# Patient Record
Sex: FEMALE | Race: WHITE | NOT HISPANIC OR LATINO | Employment: OTHER | URBAN - METROPOLITAN AREA
[De-identification: names, ages, dates, MRNs, and addresses within clinical notes are randomized per-mention and may not be internally consistent; named-entity substitution may affect disease eponyms.]

---

## 2017-11-05 ENCOUNTER — APPOINTMENT (OUTPATIENT)
Dept: GENERAL RADIOLOGY | Facility: HOSPITAL | Age: 82
End: 2017-11-05

## 2017-11-05 ENCOUNTER — HOSPITAL ENCOUNTER (EMERGENCY)
Facility: HOSPITAL | Age: 82
Discharge: NURSING, REHAB OR LONG-TERM CARE | End: 2017-11-05
Attending: EMERGENCY MEDICINE | Admitting: EMERGENCY MEDICINE

## 2017-11-05 VITALS
OXYGEN SATURATION: 99 % | DIASTOLIC BLOOD PRESSURE: 83 MMHG | SYSTOLIC BLOOD PRESSURE: 150 MMHG | WEIGHT: 153 LBS | HEART RATE: 50 BPM | TEMPERATURE: 97.6 F | HEIGHT: 63 IN | BODY MASS INDEX: 27.11 KG/M2 | RESPIRATION RATE: 14 BRPM

## 2017-11-05 DIAGNOSIS — R06.00 DYSPNEA, UNSPECIFIED TYPE: Primary | ICD-10-CM

## 2017-11-05 DIAGNOSIS — R07.89 CHEST DISCOMFORT: ICD-10-CM

## 2017-11-05 LAB
ALBUMIN SERPL-MCNC: 3.8 G/DL (ref 3.5–5.2)
ALBUMIN/GLOB SERPL: 1.5 G/DL
ALP SERPL-CCNC: 84 U/L (ref 40–129)
ALT SERPL W P-5'-P-CCNC: 6 U/L (ref 5–33)
ANION GAP SERPL CALCULATED.3IONS-SCNC: 10.8 MMOL/L
APTT PPP: 39.9 SECONDS (ref 24.3–38.1)
AST SERPL-CCNC: 13 U/L (ref 5–32)
BASOPHILS # BLD AUTO: 0.02 10*3/MM3 (ref 0–0.2)
BASOPHILS NFR BLD AUTO: 0.4 % (ref 0–2)
BILIRUB SERPL-MCNC: 0.4 MG/DL (ref 0.2–1.2)
BUN BLD-MCNC: 25 MG/DL (ref 8–23)
BUN/CREAT SERPL: 19.5 (ref 7–25)
CALCIUM SPEC-SCNC: 9.9 MG/DL (ref 8.2–9.6)
CHLORIDE SERPL-SCNC: 102 MMOL/L (ref 98–107)
CO2 SERPL-SCNC: 27.2 MMOL/L (ref 22–29)
CREAT BLD-MCNC: 1.28 MG/DL (ref 0.57–1)
DEPRECATED RDW RBC AUTO: 50.4 FL (ref 37–54)
EOSINOPHIL # BLD AUTO: 0.15 10*3/MM3 (ref 0.1–0.3)
EOSINOPHIL NFR BLD AUTO: 3.1 % (ref 0–4)
ERYTHROCYTE [DISTWIDTH] IN BLOOD BY AUTOMATED COUNT: 14.3 % (ref 11.5–14.5)
GFR SERPL CREATININE-BSD FRML MDRD: 39 ML/MIN/1.73
GLOBULIN UR ELPH-MCNC: 2.6 GM/DL
GLUCOSE BLD-MCNC: 103 MG/DL (ref 65–99)
HCT VFR BLD AUTO: 39.8 % (ref 37–47)
HGB BLD-MCNC: 13 G/DL (ref 12–16)
IMM GRANULOCYTES # BLD: 0.04 10*3/MM3 (ref 0–0.03)
IMM GRANULOCYTES NFR BLD: 0.8 % (ref 0–0.5)
INR PPP: 1.19 (ref 0.9–1.1)
LYMPHOCYTES # BLD AUTO: 0.84 10*3/MM3 (ref 0.6–4.8)
LYMPHOCYTES NFR BLD AUTO: 17.4 % (ref 20–45)
MCH RBC QN AUTO: 31.4 PG (ref 27–31)
MCHC RBC AUTO-ENTMCNC: 32.7 G/DL (ref 31–37)
MCV RBC AUTO: 96.1 FL (ref 81–99)
MONOCYTES # BLD AUTO: 0.43 10*3/MM3 (ref 0–1)
MONOCYTES NFR BLD AUTO: 8.9 % (ref 3–8)
NEUTROPHILS # BLD AUTO: 3.36 10*3/MM3 (ref 1.5–8.3)
NEUTROPHILS NFR BLD AUTO: 69.4 % (ref 45–70)
NRBC BLD MANUAL-RTO: 0 /100 WBC (ref 0–0)
PLATELET # BLD AUTO: 240 10*3/MM3 (ref 140–500)
PMV BLD AUTO: 11 FL (ref 7.4–10.4)
POTASSIUM BLD-SCNC: 4.2 MMOL/L (ref 3.5–5.2)
PROT SERPL-MCNC: 6.4 G/DL (ref 6–8.5)
PROTHROMBIN TIME: 15.2 SECONDS (ref 12.1–15)
RBC # BLD AUTO: 4.14 10*6/MM3 (ref 4.2–5.4)
SODIUM BLD-SCNC: 140 MMOL/L (ref 136–145)
TROPONIN T SERPL-MCNC: <0.01 NG/ML (ref 0–0.03)
WBC NRBC COR # BLD: 4.84 10*3/MM3 (ref 4.8–10.8)

## 2017-11-05 PROCEDURE — 71020 HC CHEST PA AND LATERAL: CPT

## 2017-11-05 PROCEDURE — 93005 ELECTROCARDIOGRAM TRACING: CPT | Performed by: EMERGENCY MEDICINE

## 2017-11-05 PROCEDURE — 85610 PROTHROMBIN TIME: CPT | Performed by: EMERGENCY MEDICINE

## 2017-11-05 PROCEDURE — 85025 COMPLETE CBC W/AUTO DIFF WBC: CPT | Performed by: EMERGENCY MEDICINE

## 2017-11-05 PROCEDURE — 99284 EMERGENCY DEPT VISIT MOD MDM: CPT | Performed by: EMERGENCY MEDICINE

## 2017-11-05 PROCEDURE — 93010 ELECTROCARDIOGRAM REPORT: CPT | Performed by: INTERNAL MEDICINE

## 2017-11-05 PROCEDURE — 85730 THROMBOPLASTIN TIME PARTIAL: CPT | Performed by: EMERGENCY MEDICINE

## 2017-11-05 PROCEDURE — 84484 ASSAY OF TROPONIN QUANT: CPT | Performed by: EMERGENCY MEDICINE

## 2017-11-05 PROCEDURE — 99284 EMERGENCY DEPT VISIT MOD MDM: CPT

## 2017-11-05 PROCEDURE — 80053 COMPREHEN METABOLIC PANEL: CPT | Performed by: EMERGENCY MEDICINE

## 2017-11-05 RX ORDER — DONEPEZIL HYDROCHLORIDE 10 MG/1
10 TABLET, FILM COATED ORAL NIGHTLY
COMMUNITY

## 2017-11-05 RX ORDER — OXYCODONE HYDROCHLORIDE AND ACETAMINOPHEN 5; 325 MG/1; MG/1
1 TABLET ORAL EVERY 4 HOURS PRN
COMMUNITY

## 2017-11-05 RX ORDER — LANOLIN ALCOHOL/MO/W.PET/CERES
1000 CREAM (GRAM) TOPICAL DAILY
COMMUNITY

## 2017-11-05 RX ORDER — CALCIUM CARBONATE 200(500)MG
1 TABLET,CHEWABLE ORAL DAILY
COMMUNITY

## 2017-11-05 RX ORDER — SODIUM BICARBONATE 650 MG/1
650 TABLET ORAL DAILY
COMMUNITY

## 2017-11-05 RX ORDER — CARVEDILOL 3.12 MG/1
6.25 TABLET ORAL 2 TIMES DAILY
COMMUNITY
Start: 2014-10-15

## 2017-11-05 RX ORDER — ACETAMINOPHEN 500 MG
1000 TABLET ORAL EVERY 6 HOURS PRN
COMMUNITY

## 2017-11-05 RX ORDER — BUMETANIDE 1 MG/1
2 TABLET ORAL DAILY
COMMUNITY
End: 2019-07-12 | Stop reason: HOSPADM

## 2017-11-05 RX ORDER — POTASSIUM CHLORIDE 20 MEQ/1
20 TABLET, EXTENDED RELEASE ORAL 2 TIMES DAILY
COMMUNITY
End: 2019-07-12 | Stop reason: HOSPADM

## 2017-11-05 RX ORDER — DOCUSATE SODIUM 250 MG
250 CAPSULE ORAL DAILY
COMMUNITY

## 2017-11-05 RX ORDER — LEVOTHYROXINE SODIUM 88 UG/1
88 TABLET ORAL DAILY
COMMUNITY

## 2017-11-05 RX ORDER — SERTRALINE HYDROCHLORIDE 100 MG/1
125 TABLET, FILM COATED ORAL DAILY
COMMUNITY

## 2017-11-05 RX ORDER — LORAZEPAM 0.5 MG/1
0.5 TABLET ORAL 2 TIMES DAILY
COMMUNITY
End: 2019-07-12 | Stop reason: HOSPADM

## 2017-11-05 RX ORDER — ZOLPIDEM TARTRATE 10 MG/1
5 TABLET ORAL NIGHTLY
COMMUNITY
End: 2019-07-12 | Stop reason: HOSPADM

## 2017-11-05 RX ORDER — RANITIDINE 150 MG/1
150 CAPSULE ORAL 2 TIMES DAILY
COMMUNITY

## 2017-11-05 RX ORDER — SODIUM CHLORIDE 0.9 % (FLUSH) 0.9 %
10 SYRINGE (ML) INJECTION AS NEEDED
Status: DISCONTINUED | OUTPATIENT
Start: 2017-11-05 | End: 2017-11-05 | Stop reason: HOSPADM

## 2017-11-05 RX ORDER — MEMANTINE HYDROCHLORIDE 10 MG/1
10 TABLET ORAL 2 TIMES DAILY
COMMUNITY

## 2017-11-05 NOTE — ED PROVIDER NOTES
"Subjective   History of Present Illness   Patient is a very limited historian.  History is primarily derived from EMS report to the ED nurse.    History of Present Illness    Chief complaint: \"I don't feel very good \"; dyspnea    Location: Apparently the patient had complained at the nursing home of some chest discomfort though at this time she denies any to me    Quality/Severity:  Moderate    Timing/Duration: \"Oh for quite a while\"    Modifying Factors: None identified    Associated Symptoms: Denies chest pain to me, denies abdominal pain, nausea and vomiting as well as cough and fever    Narrative: 89-year-old female from the [facility] presents the ED for evaluation of reported chest pain and shortness of breath.  Patient does have a history of A. fib and is anticoagulated for such.    Review of Systems  Limited history but no positive review of systems were identified and all systems reviewed and were otherwise negative.    Past Medical History:   Diagnosis Date   • Anemia    • Anxiety    • CAD (coronary artery disease)    • CHF (congestive heart failure)    • COPD (chronic obstructive pulmonary disease)    • Depression    • Encephalopathy, toxic    • GERD (gastroesophageal reflux disease)    • Herpes simplex    • Hyperlipidemia    • Hypertension    • Lesion of lower extremity     LEFT   • Osteoporosis    • Renal disorder    • Rhabdomyolysis    • Stroke        Allergies   Allergen Reactions   • Codeine    • Feldene [Piroxicam]    • Norvasc [Amlodipine Besylate]    • Relafen [Nabumetone]        Past Surgical History:   Procedure Laterality Date   • ABDOMINAL SURGERY     • CARPAL TUNNEL RELEASE     • HYSTERECTOMY     • THYROID SURGERY         No family history on file.    Social History     Social History   • Marital status:      Spouse name: N/A   • Number of children: N/A   • Years of education: N/A     Social History Main Topics   • Smoking status: Former Smoker     Packs/day: 0.50     " Years: 40.00     Types: Cigarettes     Quit date: 11/5/2007   • Smokeless tobacco: Not on file   • Alcohol use Not on file   • Drug use: No   • Sexual activity: Not Currently     Other Topics Concern   • Not on file     Social History Narrative   • No narrative on file       ED Triage Vitals   Temp Heart Rate Resp BP SpO2   11/05/17 1016 11/05/17 1016 11/05/17 1016 11/05/17 1016 11/05/17 1016   97.6 °F (36.4 °C) 54 18 153/75 91 %      Temp src Heart Rate Source Patient Position BP Location FiO2 (%)   11/05/17 1016 11/05/17 1016 11/05/17 1016 11/05/17 1016 --   Oral Monitor Lying Right arm          Objective   Physical Exam   Constitutional: She appears well-developed. No distress.   Elderly female who is a very few words is lying in bed in no acute distress.  Answer some yes no questions and follows simple requests.  Nontoxic-appearing.   HENT:   Head: Normocephalic and atraumatic.   Eyes: Conjunctivae are normal.   Cardiovascular:   Normal rate, irregularly irregular   Pulmonary/Chest: Effort normal and breath sounds normal. No respiratory distress.   Abdominal: Soft. She exhibits no distension. There is no tenderness.   Musculoskeletal: She exhibits no edema.   Moves all extremities equally.   Neurological: She is alert.   Skin: Skin is warm and dry.   Psychiatric:   Rather flat affect   Vitals reviewed.      Procedures         ED Course  ED Course   Comment By Time   Daughter now bedside.  She states that her mother has significantly advanced dementia and that complains like these are not terribly uncommon.  The patient has no complaint this time his been resting throughout her stay in the ED.  Patient's daughter expresses understanding of the workup and agreeable with disposition at this time back to the nursing home. London Bai MD 11/05 1354      EKG           EKG time: 1029  Rhythm/Rate: Rate controlled A. fib, 60  P waves and TN: Not applicable  QRS, axis: Narrow with slow R-wave progression   ST and T  waves: No STEMI; QTc within normal limits     Interpreted contemporaneously with treatment by me, independently viewed  Comparison unavailable    Results for orders placed or performed during the hospital encounter of 11/05/17   Comprehensive Metabolic Panel   Result Value Ref Range    Glucose 103 (H) 65 - 99 mg/dL    BUN 25 (H) 8 - 23 mg/dL    Creatinine 1.28 (H) 0.57 - 1.00 mg/dL    Sodium 140 136 - 145 mmol/L    Potassium 4.2 3.5 - 5.2 mmol/L    Chloride 102 98 - 107 mmol/L    CO2 27.2 22.0 - 29.0 mmol/L    Calcium 9.9 (H) 8.2 - 9.6 mg/dL    Total Protein 6.4 6.0 - 8.5 g/dL    Albumin 3.80 3.50 - 5.20 g/dL    ALT (SGPT) 6 5 - 33 U/L    AST (SGOT) 13 5 - 32 U/L    Alkaline Phosphatase 84 40 - 129 U/L    Total Bilirubin 0.4 0.2 - 1.2 mg/dL    eGFR Non African Amer 39 (L) >60 mL/min/1.73    Globulin 2.6 gm/dL    A/G Ratio 1.5 g/dL    BUN/Creatinine Ratio 19.5 7.0 - 25.0    Anion Gap 10.8 mmol/L   Protime-INR   Result Value Ref Range    Protime 15.2 (H) 12.1 - 15.0 Seconds    INR 1.19 (H) 0.90 - 1.10   aPTT   Result Value Ref Range    PTT 39.9 (H) 24.3 - 38.1 seconds   Troponin   Result Value Ref Range    Troponin T <0.010 0.000 - 0.030 ng/mL   CBC Auto Differential   Result Value Ref Range    WBC 4.84 4.80 - 10.80 10*3/mm3    RBC 4.14 (L) 4.20 - 5.40 10*6/mm3    Hemoglobin 13.0 12.0 - 16.0 g/dL    Hematocrit 39.8 37.0 - 47.0 %    MCV 96.1 81.0 - 99.0 fL    MCH 31.4 (H) 27.0 - 31.0 pg    MCHC 32.7 31.0 - 37.0 g/dL    RDW 14.3 11.5 - 14.5 %    RDW-SD 50.4 37.0 - 54.0 fl    MPV 11.0 (H) 7.4 - 10.4 fL    Platelets 240 140 - 500 10*3/mm3    Neutrophil % 69.4 45.0 - 70.0 %    Lymphocyte % 17.4 (L) 20.0 - 45.0 %    Monocyte % 8.9 (H) 3.0 - 8.0 %    Eosinophil % 3.1 0.0 - 4.0 %    Basophil % 0.4 0.0 - 2.0 %    Immature Grans % 0.8 (H) 0.0 - 0.5 %    Neutrophils, Absolute 3.36 1.50 - 8.30 10*3/mm3    Lymphocytes, Absolute 0.84 0.60 - 4.80 10*3/mm3    Monocytes, Absolute 0.43 0.00 - 1.00 10*3/mm3    Eosinophils, Absolute 0.15  "0.10 - 0.30 10*3/mm3    Basophils, Absolute 0.02 0.00 - 0.20 10*3/mm3    Immature Grans, Absolute 0.04 (H) 0.00 - 0.03 10*3/mm3    nRBC 0.0 0.0 - 0.0 /100 WBC     RADIOLOGY        Study: Two-view chest x-ray    Findings: Cardiomegaly without acute finding    Interpreted contemporaneously with treatment by me, independently viewed by me              MDM  Number of Diagnoses or Management Options     Amount and/or Complexity of Data Reviewed  Clinical lab tests: ordered and reviewed  Tests in the radiology section of CPT®: ordered and reviewed  Decide to obtain previous medical records or to obtain history from someone other than the patient: yes  Review and summarize past medical records: yes (___________________________________  Last hospitalization admission at this facility 5/11/15:  \"HOSPITAL COURSE:   1.  Dyspnea. The patient has returned to her baseline. She has had high urine  output secondary to the diuretics. This chest x-ray was negative. She has had a  moist cough and monocytosis on the lab, felt to be possibly a viral upper  respiratory infection.  Viral panel was ordered and results not available yet.  She is not on oxygen currently and she has DuoNeb p.r.n.   2.  CKD stage 3.  Remained stable and actually improved over previous noted  labs.   3.  Chronic leg pain.  Eggcrate mattress was ordered. PT and OT worked with  her.  Consider acetaminophen dosing regularly b.i.d. perhaps. She has  osteoarthritis and daughter felt like this was a chronic complaint.  4.  Chronic diastolic congestive heart failure. She is on diuretics.  Echocardiogram showed EF of 65% to 70% and there were no acute issues.  The  dyspnea was not felt to be secondary to new increased heart failure.   5.  Chronic atrial fibrillation. She is on a beta blocker and Eliquis with no  acute issues.  Rate is controlled.   6.  Hypertension. She is near her goal.  This can be monitored by primary care.     7.  Gastroesophageal reflux disease. " "She remained on Zantac with no acute  issues.   8.  Coronary artery disease. She is on Plavix with no acute issues.    9.  Dementia. The daughter felt like she was at her baseline. PT and OT have  been working with her and felt like she actually was a little bit better than  in the past.   10.  History of pernicious anemia.  Remained on her iron and B12 supplements.   11.  DVT prophylaxis. She is chronically on Eliquis already.   12.  Chronic venous stasis.  Wound care was following her and had no new  recommendations. \"  ______________________________________________)  Independent visualization of images, tracings, or specimens: yes (EKG, chest x-ray)        Final diagnoses:   Dyspnea, unspecified type   Chest discomfort              Medication List      Notice     No changes were made to your prescriptions during this visit.        Follow-up Information     Follow up with Your primary care provider. Schedule an appointment as soon as possible for a visit in 3 days.               London Bai MD  11/05/17 1488    "

## 2019-05-13 ENCOUNTER — APPOINTMENT (OUTPATIENT)
Dept: GENERAL RADIOLOGY | Facility: HOSPITAL | Age: 84
End: 2019-05-13

## 2019-05-13 ENCOUNTER — HOSPITAL ENCOUNTER (EMERGENCY)
Facility: HOSPITAL | Age: 84
Discharge: HOME | End: 2019-05-13
Attending: EMERGENCY MEDICINE

## 2019-05-13 VITALS
TEMPERATURE: 98 F | DIASTOLIC BLOOD PRESSURE: 69 MMHG | RESPIRATION RATE: 16 BRPM | HEART RATE: 76 BPM | HEIGHT: 62 IN | WEIGHT: 189 LBS | BODY MASS INDEX: 34.78 KG/M2 | OXYGEN SATURATION: 89 % | SYSTOLIC BLOOD PRESSURE: 156 MMHG

## 2019-05-13 DIAGNOSIS — I50.9 ACUTE ON CHRONIC CONGESTIVE HEART FAILURE, UNSPECIFIED HEART FAILURE TYPE (HCC): Primary | ICD-10-CM

## 2019-05-13 LAB
ALBUMIN SERPL-MCNC: 3.5 G/DL (ref 3.5–5.2)
ALBUMIN/GLOB SERPL: 1.3 G/DL
ALP SERPL-CCNC: 90 U/L (ref 39–117)
ALT SERPL W P-5'-P-CCNC: <5 U/L (ref 1–33)
ANION GAP SERPL CALCULATED.3IONS-SCNC: 10.5 MMOL/L
AST SERPL-CCNC: 11 U/L (ref 1–32)
BASOPHILS # BLD AUTO: 0.03 10*3/MM3 (ref 0–0.2)
BASOPHILS NFR BLD AUTO: 0.5 % (ref 0–1.5)
BILIRUB SERPL-MCNC: 0.4 MG/DL (ref 0.2–1.2)
BUN BLD-MCNC: 24 MG/DL (ref 8–23)
BUN/CREAT SERPL: 17.1 (ref 7–25)
CALCIUM SPEC-SCNC: 9.5 MG/DL (ref 8.2–9.6)
CHLORIDE SERPL-SCNC: 103 MMOL/L (ref 98–107)
CO2 SERPL-SCNC: 29.5 MMOL/L (ref 22–29)
CREAT BLD-MCNC: 1.4 MG/DL (ref 0.57–1)
DEPRECATED RDW RBC AUTO: 53.2 FL (ref 37–54)
EOSINOPHIL # BLD AUTO: 0.21 10*3/MM3 (ref 0–0.4)
EOSINOPHIL NFR BLD AUTO: 3.3 % (ref 0.3–6.2)
ERYTHROCYTE [DISTWIDTH] IN BLOOD BY AUTOMATED COUNT: 14.5 % (ref 12.3–15.4)
GFR SERPL CREATININE-BSD FRML MDRD: 35 ML/MIN/1.73
GLOBULIN UR ELPH-MCNC: 2.6 GM/DL
GLUCOSE BLD-MCNC: 95 MG/DL (ref 65–99)
HCT VFR BLD AUTO: 35.9 % (ref 34–46.6)
HGB BLD-MCNC: 11.3 G/DL (ref 12–15.9)
IMM GRANULOCYTES # BLD AUTO: 0.03 10*3/MM3 (ref 0–0.05)
IMM GRANULOCYTES NFR BLD AUTO: 0.5 % (ref 0–0.5)
LYMPHOCYTES # BLD AUTO: 1.07 10*3/MM3 (ref 0.7–3.1)
LYMPHOCYTES NFR BLD AUTO: 17 % (ref 19.6–45.3)
MCH RBC QN AUTO: 31.8 PG (ref 26.6–33)
MCHC RBC AUTO-ENTMCNC: 31.5 G/DL (ref 31.5–35.7)
MCV RBC AUTO: 101.1 FL (ref 79–97)
MONOCYTES # BLD AUTO: 0.99 10*3/MM3 (ref 0.1–0.9)
MONOCYTES NFR BLD AUTO: 15.7 % (ref 5–12)
NEUTROPHILS # BLD AUTO: 3.97 10*3/MM3 (ref 1.7–7)
NEUTROPHILS NFR BLD AUTO: 63 % (ref 42.7–76)
NRBC BLD AUTO-RTO: 0 /100 WBC (ref 0–0.2)
NT-PROBNP SERPL-MCNC: 6101 PG/ML (ref 5–1800)
PLATELET # BLD AUTO: 202 10*3/MM3 (ref 140–450)
PMV BLD AUTO: 11.9 FL (ref 6–12)
POTASSIUM BLD-SCNC: 4.6 MMOL/L (ref 3.5–5.2)
PROT SERPL-MCNC: 6.1 G/DL (ref 6–8.5)
RBC # BLD AUTO: 3.55 10*6/MM3 (ref 3.77–5.28)
SODIUM BLD-SCNC: 143 MMOL/L (ref 136–145)
TROPONIN T SERPL-MCNC: <0.01 NG/ML (ref 0–0.03)
WBC NRBC COR # BLD: 6.3 10*3/MM3 (ref 3.4–10.8)

## 2019-05-13 PROCEDURE — 93010 ELECTROCARDIOGRAM REPORT: CPT | Performed by: INTERNAL MEDICINE

## 2019-05-13 PROCEDURE — 80053 COMPREHEN METABOLIC PANEL: CPT | Performed by: EMERGENCY MEDICINE

## 2019-05-13 PROCEDURE — 83880 ASSAY OF NATRIURETIC PEPTIDE: CPT | Performed by: EMERGENCY MEDICINE

## 2019-05-13 PROCEDURE — 85025 COMPLETE CBC W/AUTO DIFF WBC: CPT | Performed by: EMERGENCY MEDICINE

## 2019-05-13 PROCEDURE — 94799 UNLISTED PULMONARY SVC/PX: CPT

## 2019-05-13 PROCEDURE — 84484 ASSAY OF TROPONIN QUANT: CPT | Performed by: EMERGENCY MEDICINE

## 2019-05-13 PROCEDURE — 93005 ELECTROCARDIOGRAM TRACING: CPT | Performed by: EMERGENCY MEDICINE

## 2019-05-13 PROCEDURE — 71045 X-RAY EXAM CHEST 1 VIEW: CPT

## 2019-05-13 PROCEDURE — 99284 EMERGENCY DEPT VISIT MOD MDM: CPT | Performed by: EMERGENCY MEDICINE

## 2019-05-13 PROCEDURE — 94640 AIRWAY INHALATION TREATMENT: CPT

## 2019-05-13 PROCEDURE — 99284 EMERGENCY DEPT VISIT MOD MDM: CPT

## 2019-05-13 RX ORDER — MIRTAZAPINE 15 MG/1
15 TABLET, FILM COATED ORAL NIGHTLY
COMMUNITY

## 2019-05-13 RX ORDER — SODIUM CHLORIDE 0.9 % (FLUSH) 0.9 %
10 SYRINGE (ML) INJECTION AS NEEDED
Status: DISCONTINUED | OUTPATIENT
Start: 2019-05-13 | End: 2019-05-13 | Stop reason: HOSPADM

## 2019-05-13 RX ORDER — BUMETANIDE 1 MG/1
1 TABLET ORAL ONCE
Status: COMPLETED | OUTPATIENT
Start: 2019-05-13 | End: 2019-05-13

## 2019-05-13 RX ORDER — CHOLECALCIFEROL (VITAMIN D3) 125 MCG
6 CAPSULE ORAL NIGHTLY
COMMUNITY

## 2019-05-13 RX ORDER — IPRATROPIUM BROMIDE AND ALBUTEROL SULFATE 2.5; .5 MG/3ML; MG/3ML
3 SOLUTION RESPIRATORY (INHALATION) ONCE
Status: COMPLETED | OUTPATIENT
Start: 2019-05-13 | End: 2019-05-13

## 2019-05-13 RX ADMIN — IPRATROPIUM BROMIDE AND ALBUTEROL SULFATE 3 ML: .5; 3 SOLUTION RESPIRATORY (INHALATION) at 18:42

## 2019-05-13 RX ADMIN — BUMETANIDE 1 MG: 1 TABLET ORAL at 19:21

## 2019-05-13 NOTE — DISCHARGE INSTRUCTIONS
Patient given additional Bumex in the Er.  Continue current medications.  Follow-up with PCP as above.  Return to ED for worsening symptoms, medical emergencies.

## 2019-05-13 NOTE — ED PROVIDER NOTES
Subjective   Ms Amparo Vergara is a 89 yo WF who presents via EMS secondary to SOA.  Patient does not have any complaint.  Nursing home called report.  They felt patient had this breath sounds in bilateral lower bases.  Oxygen saturation in the upper 80s on her usual home O2.  She was given Solu-Medrol 125 mg as well as a breathing treatment.  On EMS arrival patient sats were in the low to mid 90s.  Patient maintained her sats in the mid 90s throughout ER transportation.  This was on her usual O2 at 2 L/min.  No treatments were given by EMS.  Patient presents for evaluation.        History provided by:  Nursing home and EMS personnel  Shortness of Breath   Severity:  Unable to specify  Onset quality:  Unable to specify  Timing:  Unable to specify  Context comment:  As described above  Relieved by: Pt improved with mini-neb and solu-medrol prior to ER arrival.  Associated symptoms: cough (mild)    Associated symptoms: no abdominal pain, no chest pain, no claudication, no diaphoresis, no ear pain, no fever, no headaches, no hemoptysis, no neck pain, no rash, no sore throat, no sputum production, no syncope, no vomiting and no wheezing    Cough:     Cough characteristics:  Non-productive    Severity:  Unable to specify    Onset quality:  Unable to specify      Review of Systems   Constitutional: Negative for diaphoresis and fever.   HENT: Negative for ear pain and sore throat.    Eyes: Negative for photophobia.   Respiratory: Positive for cough (mild) and shortness of breath. Negative for hemoptysis, sputum production and wheezing.    Cardiovascular: Negative for chest pain, claudication and syncope.   Gastrointestinal: Negative for abdominal pain and vomiting.   Genitourinary: Negative for dysuria.   Musculoskeletal: Negative for neck pain.   Skin: Negative for rash.   Neurological: Negative for headaches.       Past Medical History:   Diagnosis Date   • Anemia    • Anxiety    • CAD (coronary artery disease)    • CHF  (congestive heart failure) (CMS/HCC)    • COPD (chronic obstructive pulmonary disease) (CMS/HCC)    • Depression    • Encephalopathy, toxic    • GERD (gastroesophageal reflux disease)    • Herpes simplex    • Hyperlipidemia    • Hypertension    • Lesion of lower extremity     LEFT   • Osteoporosis    • Renal disorder    • Rhabdomyolysis    • Stroke (CMS/HCC)        Allergies   Allergen Reactions   • Codeine    • Feldene [Piroxicam]    • Norvasc [Amlodipine Besylate]    • Relafen [Nabumetone]        Past Surgical History:   Procedure Laterality Date   • ABDOMINAL SURGERY     • CARPAL TUNNEL RELEASE     • HYSTERECTOMY     • THYROID SURGERY         History reviewed. No pertinent family history.    Social History     Socioeconomic History   • Marital status:      Spouse name: Not on file   • Number of children: Not on file   • Years of education: Not on file   • Highest education level: Not on file   Tobacco Use   • Smoking status: Former Smoker     Packs/day: 0.50     Years: 40.00     Pack years: 20.00     Types: Cigarettes     Last attempt to quit: 2007     Years since quittin.5   Substance and Sexual Activity   • Drug use: No   • Sexual activity: Not Currently           Objective   Physical Exam   Constitutional: She appears well-developed and well-nourished. No distress.   89 yo WF lying in bed. Patient appears in fair overall health.  She does not appear ill.  Friendly and cooperative.  Vital signs notable for BP of 158/84.  O2 sats 95% on room air.   HENT:   Head: Normocephalic and atraumatic.   Right Ear: External ear normal.   Left Ear: External ear normal.   Nose: Nose normal.   Mouth/Throat: Oropharynx is clear and moist.   Eyes: Conjunctivae and EOM are normal. Pupils are equal, round, and reactive to light.   Neck: Normal range of motion. Neck supple.   Cardiovascular: Normal rate, regular rhythm, normal heart sounds and intact distal pulses. Exam reveals no gallop and no friction rub.   No  murmur heard.  Pulmonary/Chest: Effort normal. She has decreased breath sounds (mildly decreased) in the right lower field and the left lower field. She has no wheezes. She has no rhonchi. She has no rales.   Abdominal: Soft. Bowel sounds are normal. She exhibits no distension. There is no tenderness.   Musculoskeletal: Normal range of motion.   Neurological: She is alert. She has normal reflexes.   Skin: Skin is warm and dry. She is not diaphoretic.   Psychiatric: She has a normal mood and affect. Her behavior is normal.   Nursing note and vitals reviewed.      ECG 12 Lead    Date/Time: 5/13/2019 5:23 PM  Performed by: Ilir Broderick MD  Authorized by: Ilir Broderick MD   Interpreted by physician  Comparison: compared with previous ECG from 11/5/2017  Similar to previous ECG  Rhythm: atrial fibrillation  Rate: normal  QRS axis: left  Conduction: conduction normal  ST Segments: ST segments normal  T flattening: V2, V3, V4, III and aVL  Other findings: PRWP  Clinical impression: abnormal ECG                 ED Course  ED Course as of May 13 1919   Mon May 13, 2019   1711 Patient has no complaint.  O2 sats in mid 90s during transport on her usual 2 L/min nasal cannula.  Sats in the mid 90s here.  Will obtain full set of lab work as well as chest x-ray and EKG.  [SS]   1808 CBC shows a normal white count.  Mild anemia with hemoglobin 11.3.  Hematocrit 35.9.  CMP notable for renal dysfunction with BUN 24 and creatinine 1.40.  This is baseline for patient.  Troponin is negative.  proBNP elevated at 6101.  Awaiting chest x-ray reading.  BNP has been elevated on prior lab work.  Likely secondary to patient's renal dysfunction.  [SS]   1859 Chest x-ray shows bilateral pleural effusions, pulmonary edema and questionable infiltrate.  Patient has a history of CHF.  Takes Bumex twice daily.  Patient's sats have been in the low to mid 90s throughout ER stay.  She is in no respiratory distress.  Patient is afebrile normal  white count.  No clinical evidence of pneumonia.  Patient wants to go back to the nursing home.  I do not find a strong indication for hospitalization at this point.  We will give additional Bumex prior to discharge.  I do not feel antibiotics are indicated at this time.  [SS]      ED Course User Index  [SS] Ilir Broderick MD      Labs Reviewed   COMPREHENSIVE METABOLIC PANEL - Abnormal; Notable for the following components:       Result Value    BUN 24 (*)     Creatinine 1.40 (*)     CO2 29.5 (*)     eGFR Non  Amer 35 (*)     All other components within normal limits    Narrative:     GFR Normal >60  Chronic Kidney Disease <60  Kidney Failure <15   CBC WITH AUTO DIFFERENTIAL - Abnormal; Notable for the following components:    RBC 3.55 (*)     Hemoglobin 11.3 (*)     .1 (*)     Lymphocyte % 17.0 (*)     Monocyte % 15.7 (*)     Monocytes, Absolute 0.99 (*)     All other components within normal limits   BNP (IN-HOUSE) - Abnormal; Notable for the following components:    proBNP 6,101.0 (*)     All other components within normal limits    Narrative:     Among patients with dyspnea, NT-proBNP is highly sensitive for the detection of acute congestive heart failure. In addition NT-proBNP of <300 pg/ml effectively rules out acute congestive heart failure with 99% negative predictive value.   TROPONIN (IN-HOUSE) - Normal    Narrative:     Troponin T Reference Range:  <= 0.03 ng/mL-   Negative for AMI  >0.03 ng/mL-     Abnormal for myocardial necrosis.  Clinicians would have to utilize clinical acumen, EKG, Troponin and serial changes to determine if it is an Acute Myocardial Infarction or myocardial injury due to an underlying chronic condition.    CBC AND DIFFERENTIAL    Narrative:     The following orders were created for panel order CBC & Differential.  Procedure                               Abnormality         Status                     ---------                               -----------          ------                     CBC Auto Differential[[id number]]        Abnormal            Final result                 Please view results for these tests on the individual orders.     Xr Chest 1 View    Result Date: 5/13/2019  Narrative: CR Chest 1 Vw 5/13/2019 INDICATION: Shortness of breath for 20 years, worsening today. COMPARISON:  11/5/2017 FINDINGS: Single portable AP view of the chest.  The heart is enlarged but stable. There is mild interstitial pulmonary edema. There are bilateral pleural effusions layering posteriorly, right greater than left with bibasilar infiltrates or atelectasis. No pneumothorax. Surgical clips right side of the thoracic inlet.     Impression: 1. Stable cardiac enlargement compared with 11/5/2017. 2. Pulmonary edema. 3. Bilateral pleural effusions, right greater than left with bibasilar infiltrates or atelectasis. Signer Name: Mika Limon MD  Signed: 5/13/2019 6:14 PM  Workstation Name: [id number]     My differential diagnosis for dyspnea includes but is not limited to:  Asthma, COPD, pneumonia, pulmonary embolus, acute respiratory distress syndrome, pneumothorax, pleural effusion, pulmonary fibrosis, congestive heart failure, myocardial infarction, DKA, uremia, acidosis, sepsis, anemia, drug related, hyperventilation, CNS disease              MDM  Number of Diagnoses or Management Options  Acute on chronic congestive heart failure, unspecified heart failure type (CMS/HCC): new and requires workup     Amount and/or Complexity of Data Reviewed  Clinical lab tests: reviewed and ordered  Tests in the radiology section of CPT®: reviewed and ordered    Risk of Complications, Morbidity, and/or Mortality  Presenting problems: moderate  Diagnostic procedures: moderate  Management options: low    Patient Progress  Patient progress: improved        Final diagnoses:   Acute on chronic congestive heart failure, unspecified heart failure type (CMS/HCC)            Ilir Broderick MD  05/13/19  1919

## 2019-07-07 ENCOUNTER — HOSPITAL ENCOUNTER (INPATIENT)
Facility: HOSPITAL | Age: 84
LOS: 5 days | Discharge: NURSING, REHAB OR LONG-TERM CARE | End: 2019-07-12
Attending: EMERGENCY MEDICINE | Admitting: HOSPITALIST

## 2019-07-07 ENCOUNTER — APPOINTMENT (OUTPATIENT)
Dept: GENERAL RADIOLOGY | Facility: HOSPITAL | Age: 84
End: 2019-07-07

## 2019-07-07 DIAGNOSIS — D64.9 ANEMIA, UNSPECIFIED TYPE: ICD-10-CM

## 2019-07-07 DIAGNOSIS — J96.21 ACUTE ON CHRONIC RESPIRATORY FAILURE WITH HYPOXEMIA (HCC): Primary | ICD-10-CM

## 2019-07-07 DIAGNOSIS — J90 PLEURAL EFFUSION: ICD-10-CM

## 2019-07-07 DIAGNOSIS — N17.9 AKI (ACUTE KIDNEY INJURY) (HCC): ICD-10-CM

## 2019-07-07 PROBLEM — E78.5 HYPERLIPIDEMIA: Status: ACTIVE | Noted: 2019-07-07

## 2019-07-07 PROBLEM — I50.9 CHF (CONGESTIVE HEART FAILURE) (HCC): Status: ACTIVE | Noted: 2019-07-07

## 2019-07-07 PROBLEM — N18.9 CHRONIC KIDNEY DISEASE: Status: ACTIVE | Noted: 2019-07-07

## 2019-07-07 PROBLEM — I10 BENIGN ESSENTIAL HTN: Status: ACTIVE | Noted: 2019-07-07

## 2019-07-07 PROBLEM — J44.9 COPD (CHRONIC OBSTRUCTIVE PULMONARY DISEASE) (HCC): Status: ACTIVE | Noted: 2019-07-07

## 2019-07-07 LAB
ALBUMIN SERPL-MCNC: 3.4 G/DL (ref 3.5–5.2)
ALBUMIN/GLOB SERPL: 1.2 G/DL
ALP SERPL-CCNC: 90 U/L (ref 39–117)
ALT SERPL W P-5'-P-CCNC: 6 U/L (ref 1–33)
ANION GAP SERPL CALCULATED.3IONS-SCNC: 12.1 MMOL/L (ref 5–15)
APTT PPP: 50.6 SECONDS (ref 24.3–38.1)
AST SERPL-CCNC: 15 U/L (ref 1–32)
BASOPHILS # BLD AUTO: 0.01 10*3/MM3 (ref 0–0.2)
BASOPHILS NFR BLD AUTO: 0.2 % (ref 0–1.5)
BILIRUB SERPL-MCNC: 0.3 MG/DL (ref 0.2–1.2)
BUN BLD-MCNC: 35 MG/DL (ref 8–23)
BUN/CREAT SERPL: 21.2 (ref 7–25)
CALCIUM SPEC-SCNC: 9.7 MG/DL (ref 8.2–9.6)
CHLORIDE SERPL-SCNC: 106 MMOL/L (ref 98–107)
CO2 SERPL-SCNC: 25.9 MMOL/L (ref 22–29)
CREAT BLD-MCNC: 1.65 MG/DL (ref 0.57–1)
DEPRECATED RDW RBC AUTO: 56.5 FL (ref 37–54)
EOSINOPHIL # BLD AUTO: 0.17 10*3/MM3 (ref 0–0.4)
EOSINOPHIL NFR BLD AUTO: 3.9 % (ref 0.3–6.2)
ERYTHROCYTE [DISTWIDTH] IN BLOOD BY AUTOMATED COUNT: 15.2 % (ref 12.3–15.4)
GFR SERPL CREATININE-BSD FRML MDRD: 29 ML/MIN/1.73
GLOBULIN UR ELPH-MCNC: 2.9 GM/DL
GLUCOSE BLD-MCNC: 96 MG/DL (ref 65–99)
HCT VFR BLD AUTO: 33.8 % (ref 34–46.6)
HGB BLD-MCNC: 10.5 G/DL (ref 12–15.9)
IMM GRANULOCYTES # BLD AUTO: 0.03 10*3/MM3 (ref 0–0.05)
IMM GRANULOCYTES NFR BLD AUTO: 0.7 % (ref 0–0.5)
INR PPP: 1.21 (ref 0.9–1.1)
LYMPHOCYTES # BLD AUTO: 0.83 10*3/MM3 (ref 0.7–3.1)
LYMPHOCYTES NFR BLD AUTO: 19.1 % (ref 19.6–45.3)
MCH RBC QN AUTO: 31.6 PG (ref 26.6–33)
MCHC RBC AUTO-ENTMCNC: 31.1 G/DL (ref 31.5–35.7)
MCV RBC AUTO: 101.8 FL (ref 79–97)
MONOCYTES # BLD AUTO: 0.76 10*3/MM3 (ref 0.1–0.9)
MONOCYTES NFR BLD AUTO: 17.5 % (ref 5–12)
NEUTROPHILS # BLD AUTO: 2.55 10*3/MM3 (ref 1.7–7)
NEUTROPHILS NFR BLD AUTO: 58.6 % (ref 42.7–76)
NRBC BLD AUTO-RTO: 0 /100 WBC (ref 0–0.2)
NT-PROBNP SERPL-MCNC: ABNORMAL PG/ML (ref 5–1800)
PLATELET # BLD AUTO: 168 10*3/MM3 (ref 140–450)
PMV BLD AUTO: 12.3 FL (ref 6–12)
POTASSIUM BLD-SCNC: 4.9 MMOL/L (ref 3.5–5.2)
PROCALCITONIN SERPL-MCNC: 0.04 NG/ML (ref 0.1–0.25)
PROCALCITONIN SERPL-MCNC: 0.05 NG/ML (ref 0.1–0.25)
PROT SERPL-MCNC: 6.3 G/DL (ref 6–8.5)
PROTHROMBIN TIME: 15 SECONDS (ref 12.1–15)
RBC # BLD AUTO: 3.32 10*6/MM3 (ref 3.77–5.28)
SODIUM BLD-SCNC: 144 MMOL/L (ref 136–145)
TROPONIN T SERPL-MCNC: <0.01 NG/ML (ref 0–0.03)
TROPONIN T SERPL-MCNC: <0.01 NG/ML (ref 0–0.03)
TSH SERPL DL<=0.05 MIU/L-ACNC: 2.19 MIU/ML (ref 0.27–4.2)
WBC NRBC COR # BLD: 4.35 10*3/MM3 (ref 3.4–10.8)

## 2019-07-07 PROCEDURE — 94640 AIRWAY INHALATION TREATMENT: CPT

## 2019-07-07 PROCEDURE — 80053 COMPREHEN METABOLIC PANEL: CPT | Performed by: EMERGENCY MEDICINE

## 2019-07-07 PROCEDURE — 84443 ASSAY THYROID STIM HORMONE: CPT | Performed by: HOSPITALIST

## 2019-07-07 PROCEDURE — 93010 ELECTROCARDIOGRAM REPORT: CPT | Performed by: INTERNAL MEDICINE

## 2019-07-07 PROCEDURE — 93005 ELECTROCARDIOGRAM TRACING: CPT | Performed by: EMERGENCY MEDICINE

## 2019-07-07 PROCEDURE — 84484 ASSAY OF TROPONIN QUANT: CPT | Performed by: HOSPITALIST

## 2019-07-07 PROCEDURE — 94799 UNLISTED PULMONARY SVC/PX: CPT

## 2019-07-07 PROCEDURE — 99222 1ST HOSP IP/OBS MODERATE 55: CPT | Performed by: HOSPITALIST

## 2019-07-07 PROCEDURE — 99285 EMERGENCY DEPT VISIT HI MDM: CPT | Performed by: EMERGENCY MEDICINE

## 2019-07-07 PROCEDURE — 83880 ASSAY OF NATRIURETIC PEPTIDE: CPT | Performed by: EMERGENCY MEDICINE

## 2019-07-07 PROCEDURE — 71046 X-RAY EXAM CHEST 2 VIEWS: CPT

## 2019-07-07 PROCEDURE — 84145 PROCALCITONIN (PCT): CPT | Performed by: HOSPITALIST

## 2019-07-07 PROCEDURE — 85025 COMPLETE CBC W/AUTO DIFF WBC: CPT | Performed by: EMERGENCY MEDICINE

## 2019-07-07 PROCEDURE — 85610 PROTHROMBIN TIME: CPT | Performed by: EMERGENCY MEDICINE

## 2019-07-07 PROCEDURE — 25010000002 MORPHINE PER 10 MG: Performed by: EMERGENCY MEDICINE

## 2019-07-07 PROCEDURE — 84484 ASSAY OF TROPONIN QUANT: CPT | Performed by: EMERGENCY MEDICINE

## 2019-07-07 PROCEDURE — 84145 PROCALCITONIN (PCT): CPT | Performed by: EMERGENCY MEDICINE

## 2019-07-07 PROCEDURE — 99285 EMERGENCY DEPT VISIT HI MDM: CPT

## 2019-07-07 PROCEDURE — 85730 THROMBOPLASTIN TIME PARTIAL: CPT | Performed by: EMERGENCY MEDICINE

## 2019-07-07 RX ORDER — BISACODYL 10 MG
10 SUPPOSITORY, RECTAL RECTAL DAILY PRN
Status: DISCONTINUED | OUTPATIENT
Start: 2019-07-07 | End: 2019-07-12 | Stop reason: HOSPADM

## 2019-07-07 RX ORDER — DOCUSATE SODIUM 250 MG
250 CAPSULE ORAL DAILY
Status: DISCONTINUED | OUTPATIENT
Start: 2019-07-07 | End: 2019-07-07 | Stop reason: CLARIF

## 2019-07-07 RX ORDER — LISINOPRIL 5 MG/1
5 TABLET ORAL DAILY
Status: DISCONTINUED | OUTPATIENT
Start: 2019-07-07 | End: 2019-07-09

## 2019-07-07 RX ORDER — LORAZEPAM 0.5 MG/1
0.5 TABLET ORAL 2 TIMES DAILY
Status: DISCONTINUED | OUTPATIENT
Start: 2019-07-07 | End: 2019-07-12 | Stop reason: HOSPADM

## 2019-07-07 RX ORDER — BUMETANIDE 1 MG/1
1 TABLET ORAL NIGHTLY
COMMUNITY
End: 2019-07-12 | Stop reason: HOSPADM

## 2019-07-07 RX ORDER — LEVOTHYROXINE SODIUM 88 UG/1
88 TABLET ORAL DAILY
Status: DISCONTINUED | OUTPATIENT
Start: 2019-07-07 | End: 2019-07-12 | Stop reason: HOSPADM

## 2019-07-07 RX ORDER — MEMANTINE HYDROCHLORIDE 5 MG/1
10 TABLET ORAL EVERY 12 HOURS SCHEDULED
Status: DISCONTINUED | OUTPATIENT
Start: 2019-07-07 | End: 2019-07-12 | Stop reason: HOSPADM

## 2019-07-07 RX ORDER — POTASSIUM CHLORIDE 20 MEQ/1
20 TABLET, EXTENDED RELEASE ORAL DAILY
Status: DISCONTINUED | OUTPATIENT
Start: 2019-07-07 | End: 2019-07-07

## 2019-07-07 RX ORDER — ACETAMINOPHEN 500 MG
1000 TABLET ORAL EVERY 6 HOURS PRN
Status: DISCONTINUED | OUTPATIENT
Start: 2019-07-07 | End: 2019-07-12 | Stop reason: HOSPADM

## 2019-07-07 RX ORDER — MIRTAZAPINE 15 MG/1
15 TABLET, FILM COATED ORAL NIGHTLY
Status: DISCONTINUED | OUTPATIENT
Start: 2019-07-07 | End: 2019-07-12 | Stop reason: HOSPADM

## 2019-07-07 RX ORDER — CHOLECALCIFEROL (VITAMIN D3) 125 MCG
5 CAPSULE ORAL NIGHTLY
Status: DISCONTINUED | OUTPATIENT
Start: 2019-07-07 | End: 2019-07-12 | Stop reason: HOSPADM

## 2019-07-07 RX ORDER — FAMOTIDINE 20 MG/1
20 TABLET, FILM COATED ORAL
Status: DISCONTINUED | OUTPATIENT
Start: 2019-07-07 | End: 2019-07-08

## 2019-07-07 RX ORDER — BUMETANIDE 1 MG/1
2 TABLET ORAL DAILY
Status: DISCONTINUED | OUTPATIENT
Start: 2019-07-07 | End: 2019-07-07

## 2019-07-07 RX ORDER — BUMETANIDE 1 MG/1
2 TABLET ORAL 3 TIMES DAILY
Status: DISCONTINUED | OUTPATIENT
Start: 2019-07-07 | End: 2019-07-08

## 2019-07-07 RX ORDER — DOCUSATE SODIUM 100 MG/1
200 CAPSULE, LIQUID FILLED ORAL DAILY
Status: DISCONTINUED | OUTPATIENT
Start: 2019-07-07 | End: 2019-07-12 | Stop reason: HOSPADM

## 2019-07-07 RX ORDER — LACTULOSE 10 G/15ML
20 SOLUTION ORAL DAILY PRN
COMMUNITY

## 2019-07-07 RX ORDER — MULTIPLE VITAMINS W/ MINERALS TAB 9MG-400MCG
1 TAB ORAL DAILY
COMMUNITY

## 2019-07-07 RX ORDER — SODIUM BICARBONATE 650 MG/1
650 TABLET ORAL DAILY
Status: DISCONTINUED | OUTPATIENT
Start: 2019-07-07 | End: 2019-07-12 | Stop reason: HOSPADM

## 2019-07-07 RX ORDER — BISACODYL 10 MG
10 SUPPOSITORY, RECTAL RECTAL DAILY PRN
COMMUNITY

## 2019-07-07 RX ORDER — LORAZEPAM 0.5 MG/1
0.5 TABLET ORAL DAILY PRN
COMMUNITY
End: 2019-07-12 | Stop reason: HOSPADM

## 2019-07-07 RX ORDER — SODIUM CHLORIDE 0.9 % (FLUSH) 0.9 %
3-10 SYRINGE (ML) INJECTION AS NEEDED
Status: DISCONTINUED | OUTPATIENT
Start: 2019-07-07 | End: 2019-07-07

## 2019-07-07 RX ORDER — LISINOPRIL 5 MG/1
5 TABLET ORAL DAILY
COMMUNITY
End: 2019-07-12 | Stop reason: HOSPADM

## 2019-07-07 RX ORDER — BUMETANIDE 1 MG/1
2 TABLET ORAL NIGHTLY
Status: DISCONTINUED | OUTPATIENT
Start: 2019-07-07 | End: 2019-07-07

## 2019-07-07 RX ORDER — DONEPEZIL HYDROCHLORIDE 5 MG/1
10 TABLET, FILM COATED ORAL NIGHTLY
Status: DISCONTINUED | OUTPATIENT
Start: 2019-07-07 | End: 2019-07-12 | Stop reason: HOSPADM

## 2019-07-07 RX ORDER — BUMETANIDE 1 MG/1
1 TABLET ORAL NIGHTLY
Status: DISCONTINUED | OUTPATIENT
Start: 2019-07-07 | End: 2019-07-07

## 2019-07-07 RX ORDER — CARVEDILOL 6.25 MG/1
6.25 TABLET ORAL 2 TIMES DAILY
Status: DISCONTINUED | OUTPATIENT
Start: 2019-07-07 | End: 2019-07-07

## 2019-07-07 RX ORDER — OXYCODONE HYDROCHLORIDE AND ACETAMINOPHEN 5; 325 MG/1; MG/1
1 TABLET ORAL EVERY 4 HOURS PRN
Status: DISCONTINUED | OUTPATIENT
Start: 2019-07-07 | End: 2019-07-12 | Stop reason: HOSPADM

## 2019-07-07 RX ORDER — SODIUM CHLORIDE 9 MG/ML
40 INJECTION, SOLUTION INTRAVENOUS AS NEEDED
Status: DISCONTINUED | OUTPATIENT
Start: 2019-07-07 | End: 2019-07-07

## 2019-07-07 RX ORDER — SODIUM CHLORIDE 0.9 % (FLUSH) 0.9 %
10 SYRINGE (ML) INJECTION AS NEEDED
Status: DISCONTINUED | OUTPATIENT
Start: 2019-07-07 | End: 2019-07-12 | Stop reason: HOSPADM

## 2019-07-07 RX ORDER — SODIUM CHLORIDE 0.9 % (FLUSH) 0.9 %
3 SYRINGE (ML) INJECTION EVERY 12 HOURS SCHEDULED
Status: DISCONTINUED | OUTPATIENT
Start: 2019-07-07 | End: 2019-07-07

## 2019-07-07 RX ADMIN — IPRATROPIUM BROMIDE 0.5 MG: 0.5 SOLUTION RESPIRATORY (INHALATION) at 17:09

## 2019-07-07 RX ADMIN — MEMANTINE HYDROCHLORIDE 10 MG: 5 TABLET ORAL at 08:26

## 2019-07-07 RX ADMIN — LEVOTHYROXINE SODIUM 88 MCG: 0.09 TABLET ORAL at 08:26

## 2019-07-07 RX ADMIN — APIXABAN 2.5 MG: 2.5 TABLET, FILM COATED ORAL at 20:46

## 2019-07-07 RX ADMIN — CARVEDILOL 6.25 MG: 6.25 TABLET, FILM COATED ORAL at 08:25

## 2019-07-07 RX ADMIN — SODIUM BICARBONATE 650 MG TABLET 650 MG: at 08:28

## 2019-07-07 RX ADMIN — BUMETANIDE 2 MG: 1 TABLET ORAL at 08:24

## 2019-07-07 RX ADMIN — POTASSIUM CHLORIDE 20 MEQ: 1500 TABLET, EXTENDED RELEASE ORAL at 08:26

## 2019-07-07 RX ADMIN — MIRTAZAPINE 15 MG: 15 TABLET, FILM COATED ORAL at 20:46

## 2019-07-07 RX ADMIN — FAMOTIDINE 20 MG: 20 TABLET, FILM COATED ORAL at 08:25

## 2019-07-07 RX ADMIN — SERTRALINE HYDROCHLORIDE 125 MG: 50 TABLET ORAL at 08:27

## 2019-07-07 RX ADMIN — SODIUM CHLORIDE, PRESERVATIVE FREE 3 ML: 5 INJECTION INTRAVENOUS at 08:27

## 2019-07-07 RX ADMIN — LORAZEPAM 0.5 MG: 0.5 TABLET ORAL at 20:53

## 2019-07-07 RX ADMIN — MEMANTINE HYDROCHLORIDE 10 MG: 5 TABLET ORAL at 20:46

## 2019-07-07 RX ADMIN — DONEPEZIL HYDROCHLORIDE 10 MG: 5 TABLET, FILM COATED ORAL at 20:46

## 2019-07-07 RX ADMIN — LISINOPRIL 5 MG: 5 TABLET ORAL at 08:26

## 2019-07-07 RX ADMIN — MELATONIN TAB 5 MG 5 MG: 5 TAB at 20:53

## 2019-07-07 RX ADMIN — BUMETANIDE 2 MG: 1 TABLET ORAL at 18:47

## 2019-07-07 RX ADMIN — APIXABAN 2.5 MG: 2.5 TABLET, FILM COATED ORAL at 08:24

## 2019-07-07 RX ADMIN — LORAZEPAM 0.5 MG: 0.5 TABLET ORAL at 08:32

## 2019-07-07 RX ADMIN — BUMETANIDE 2 MG: 1 TABLET ORAL at 20:53

## 2019-07-07 RX ADMIN — MORPHINE SULFATE 2 MG: 4 INJECTION INTRAVENOUS at 02:37

## 2019-07-07 RX ADMIN — FAMOTIDINE 20 MG: 20 TABLET, FILM COATED ORAL at 18:48

## 2019-07-07 RX ADMIN — DOCUSATE SODIUM 200 MG: 100 CAPSULE, LIQUID FILLED ORAL at 08:25

## 2019-07-07 NOTE — ED PROVIDER NOTES
EMERGENCY DEPARTMENT ENCOUNTER      Room Number: D/D      HPI:    Chief complaint: Dyspnea    Location: Pain complaint is left knee    Quality/Severity: Moderate    Timing/Duration: Dyspnea has been progressive over 3 days    Modifying Factors: None clearly identified    Associated Symptoms: Patient denies fever and cough.  She does complain of left knee pain today is similar to an exacerbation of her chronic arthritis/knee pain.  Denies chest pain, abdominal pain, nausea and vomiting.  She does admit that her appetite is significantly diminished from baseline.  No dysuria or hematuria.  No black or bloody stools reported.    Narrative: Pt is a 89 y.o. female transfer to the emergency department from her nursing home in [facility] for complaint of increased shortness of breath and was reportedly 50% SPO2 on 2 L by nasal cannula prior to transfer.  Lasix administered prior to arrival by EMS.    PMD: Nikita Castellon MD    REVIEW OF SYSTEMS  Review of Systems  Patient denies fever and cough.  She does complain of left knee pain today is similar to an exacerbation of her chronic arthritis/knee pain.  Denies chest pain, abdominal pain, nausea and vomiting.  She does admit that her appetite is significantly diminished from baseline.  No dysuria or hematuria.  No black or bloody stools reported.    PAST MEDICAL HISTORY  Active Ambulatory Problems     Diagnosis Date Noted   • No Active Ambulatory Problems     Resolved Ambulatory Problems     Diagnosis Date Noted   • No Resolved Ambulatory Problems     Past Medical History:   Diagnosis Date   • Anemia    • Anxiety    • CAD (coronary artery disease)    • CHF (congestive heart failure) (CMS/Formerly [facility])    • COPD (chronic obstructive pulmonary disease) (CMS/Formerly [facility])    • Depression    • Encephalopathy, toxic    • GERD (gastroesophageal reflux disease)    • Herpes simplex    • Hyperlipidemia    • Hypertension    • Lesion of lower extremity    • Osteoporosis    • Renal disorder    •  Rhabdomyolysis    • Stroke (CMS/HCC)        PAST SURGICAL HISTORY  Past Surgical History:   Procedure Laterality Date   • ABDOMINAL SURGERY     • CARPAL TUNNEL RELEASE     • HYSTERECTOMY     • THYROID SURGERY         FAMILY HISTORY  History reviewed. No pertinent family history.    SOCIAL HISTORY  Social History     Socioeconomic History   • Marital status:      Spouse name: Not on file   • Number of children: Not on file   • Years of education: Not on file   • Highest education level: Not on file   Tobacco Use   • Smoking status: Former Smoker     Packs/day: 0.50     Years: 40.00     Pack years: 20.00     Types: Cigarettes     Last attempt to quit: 2007     Years since quittin.6   Substance and Sexual Activity   • Drug use: No   • Sexual activity: Not Currently       ALLERGIES  Codeine; Feldene [piroxicam]; Norvasc [amlodipine besylate]; and Relafen [nabumetone]    PHYSICAL EXAM  ED Triage Vitals   Temp Heart Rate Resp BP SpO2   19 0148 19 0151 19 0151 19 0151 19 0151   97.5 °F (36.4 °C) 77 16 145/83 93 %      Temp src Heart Rate Source Patient Position BP Location FiO2 (%)   19 0148 19 0151 19 0151 19 0151 --   Oral Monitor Lying Right arm            Physical Exam   Constitutional: She is oriented to person, place, and time and well-developed, well-nourished, and in no distress. No distress.   HENT:   Head: Normocephalic.   Mouth/Throat: Oropharynx is clear and moist.   Mucous membranes moist   Eyes: Conjunctivae are normal. No scleral icterus.   Neck:   Painless range of motion   Cardiovascular:   Irregularly irregular, rate controlled- 70   Pulmonary/Chest: Effort normal. No respiratory distress.   Mild bibasilar crackles   Abdominal: Soft. There is no tenderness.   Musculoskeletal: She exhibits no edema.   Degenerative changes bilateral knees consistent with history of arthritis.  No erythema or significant swelling from either knee.  Pain  with range of motion noted bilateral knees.  No significant effusion appreciated.  Atraumatic external exam   Neurological: She is alert and oriented to person, place, and time.   Skin: Skin is warm and dry.   Psychiatric: Mood and affect normal.   Nursing note and vitals reviewed.      LAB RESULTS  Results for orders placed or performed during the hospital encounter of 07/07/19   Comprehensive Metabolic Panel   Result Value Ref Range    Glucose 96 65 - 99 mg/dL    BUN 35 (H) 8 - 23 mg/dL    Creatinine 1.65 (H) 0.57 - 1.00 mg/dL    Sodium 144 136 - 145 mmol/L    Potassium 4.9 3.5 - 5.2 mmol/L    Chloride 106 98 - 107 mmol/L    CO2 25.9 22.0 - 29.0 mmol/L    Calcium 9.7 (H) 8.2 - 9.6 mg/dL    Total Protein 6.3 6.0 - 8.5 g/dL    Albumin 3.40 (L) 3.50 - 5.20 g/dL    ALT (SGPT) 6 1 - 33 U/L    AST (SGOT) 15 1 - 32 U/L    Alkaline Phosphatase 90 39 - 117 U/L    Total Bilirubin 0.3 0.2 - 1.2 mg/dL    eGFR Non African Amer 29 (L) >60 mL/min/1.73    Globulin 2.9 gm/dL    A/G Ratio 1.2 g/dL    BUN/Creatinine Ratio 21.2 7.0 - 25.0    Anion Gap 12.1 5.0 - 15.0 mmol/L   BNP   Result Value Ref Range    proBNP 15,695.0 (H) 5.0-1,800.0 pg/mL   Troponin   Result Value Ref Range    Troponin T <0.010 0.000-<0.030 ng/mL   Protime-INR   Result Value Ref Range    Protime 15.0 12.1 - 15.0 Seconds    INR 1.21 (H) 0.90 - 1.10   aPTT   Result Value Ref Range    PTT 50.6 (H) 24.3 - 38.1 seconds   Procalcitonin   Result Value Ref Range    Procalcitonin 0.04 (L) 0.10 - 0.25 ng/mL   CBC Auto Differential   Result Value Ref Range    WBC 4.35 3.40 - 10.80 10*3/mm3    RBC 3.32 (L) 3.77 - 5.28 10*6/mm3    Hemoglobin 10.5 (L) 12.0 - 15.9 g/dL    Hematocrit 33.8 (L) 34.0 - 46.6 %    .8 (H) 79.0 - 97.0 fL    MCH 31.6 26.6 - 33.0 pg    MCHC 31.1 (L) 31.5 - 35.7 g/dL    RDW 15.2 12.3 - 15.4 %    RDW-SD 56.5 (H) 37.0 - 54.0 fl    MPV 12.3 (H) 6.0 - 12.0 fL    Platelets 168 140 - 450 10*3/mm3    Neutrophil % 58.6 42.7 - 76.0 %    Lymphocyte % 19.1  (L) 19.6 - 45.3 %    Monocyte % 17.5 (H) 5.0 - 12.0 %    Eosinophil % 3.9 0.3 - 6.2 %    Basophil % 0.2 0.0 - 1.5 %    Immature Grans % 0.7 (H) 0.0 - 0.5 %    Neutrophils, Absolute 2.55 1.70 - 7.00 10*3/mm3    Lymphocytes, Absolute 0.83 0.70 - 3.10 10*3/mm3    Monocytes, Absolute 0.76 0.10 - 0.90 10*3/mm3    Eosinophils, Absolute 0.17 0.00 - 0.40 10*3/mm3    Basophils, Absolute 0.01 0.00 - 0.20 10*3/mm3    Immature Grans, Absolute 0.03 0.00 - 0.05 10*3/mm3    nRBC 0.0 0.0 - 0.2 /100 WBC         I ordered the above labs and reviewed the results    RADIOLOGY  Xr Chest 2 View    Result Date: 7/7/2019  Narrative: CR Chest 2 Vws INDICATION:  Increasing shortness or hair over the last 3 days COMPARISON:  5/13/2019 FINDINGS: PA and lateral views of the chest.  There appear to be bilateral right greater than left small to moderate effusions and mild bibasilar atelectasis. Effusions best seen on lateral view. The upper lobes are clear. Heart size is mildly prominent. There are degenerative changes in the right shoulder      Impression: Increased density in both lung bases suggest right greater than left effusions with bibasilar atelectasis. Signer Name: Watson Salazar MD  Signed: 7/7/2019 2:37 AM  Workstation Name: [email or web address]-[employer] (ABAM)       I ordered the above radiologic testing and reviewed the results    PROCEDURES  Procedures    EKG           EKG time: 0201  Rhythm/Rate: A. fib, 70  P waves and [place]: NA  QRS, axis: Narrow  ST and T waves: No STEMI; QTC within normal limits; diffuse T wave flattening/inversions    Interpreted Contemporaneously by me, independently viewed  Comparison: 5/13/2019-T wave inversions are new inferolaterally      PROGRESS AND CONSULTS  ED Course as of Jul 07 0301   Sun Jul 07, 2019   0258 CONSULT        Provider: Dr. Rivera - [facility]    Discussion: Reviewed patient history, ED presentation and evaluation he accepts the patient is a full admission with telemetry.    Agreeable c treatment and planned  disposition.          [RS]      ED Course User Index  [RS] London Bai MD           MEDICAL DECISION MAKING  Results were reviewed/discussed with the patient and they were also made aware of online access. Pt also made aware that some labs, such as cultures, will not be resulted during ER visit and follow up with PMD is necessary.     MDM  Number of Diagnoses or Management Options  Acute on chronic respiratory failure with hypoxemia (CMS/HCC):   LOLLY (acute kidney injury) (CMS/HCC):   Anemia, unspecified type:   Pleural effusion:      Amount and/or Complexity of Data Reviewed  Clinical lab tests: reviewed  Tests in the radiology section of CPT®: reviewed  Tests in the medicine section of CPT®: reviewed  Decide to obtain previous medical records or to obtain history from someone other than the patient: yes  Review and summarize past medical records: yes  Discuss the patient with other providers: yes  Independent visualization of images, tracings, or specimens: yes           DIAGNOSIS  Final diagnoses:   Acute on chronic respiratory failure with hypoxemia (CMS/HCC)   Pleural effusion   LOLLY (acute kidney injury) (CMS/HCC)   Anemia, unspecified type       Latest Documented Vital Signs:  As of 3:01 AM  BP- 117/61 HR- 65 Temp- 97.5 °F (36.4 °C) (Oral) O2 sat- 95%    DISPOSITION  Admit - Full c tele       Medication List      No changes were made to your prescriptions during this visit.                London Bai MD  07/07/19 4230

## 2019-07-07 NOTE — ED NOTES
Called report to THADDEUS Mayes.  Pt to go to room 1419 on telemetry.     Isabell Rojas RN  07/07/19 [id number]

## 2019-07-07 NOTE — PLAN OF CARE
Problem: Patient Care Overview  Goal: Plan of Care Review  Outcome: Ongoing (interventions implemented as appropriate)   07/07/19 0453   Coping/Psychosocial   Plan of Care Reviewed With patient   Plan of Care Review   Progress no change   OTHER   Outcome Summary New admit, waiting for orders, on 4L NC, assist resting comfortably at this point      Goal: Individualization and Mutuality  Outcome: Ongoing (interventions implemented as appropriate)      Problem: Fall Risk (Adult)  Goal: Identify Related Risk Factors and Signs and Symptoms  Outcome: Outcome(s) achieved Date Met: 07/07/19    Goal: Absence of Fall  Outcome: Ongoing (interventions implemented as appropriate)      Problem: Pain, Chronic (Adult)  Goal: Identify Related Risk Factors and Signs and Symptoms  Outcome: Outcome(s) achieved Date Met: 07/07/19    Goal: Acceptable Pain/Comfort Level and Functional Ability  Outcome: Ongoing (interventions implemented as appropriate)      Problem: Breathing Pattern Ineffective (Adult)  Goal: Identify Related Risk Factors and Signs and Symptoms  Outcome: Outcome(s) achieved Date Met: 07/07/19    Goal: Effective Oxygenation/Ventilation  Outcome: Ongoing (interventions implemented as appropriate)    Goal: Anxiety/Fear Reduction  Outcome: Ongoing (interventions implemented as appropriate)

## 2019-07-07 NOTE — H&P
[facility]     Nikita Castellon MD    CHIEF COMPLAINT: Worsening dyspnea    HISTORY OF PRESENT ILLNESS:    Ms. Vergara is a 89 y/o  female, who is a resident at [facility], who developed worsening dyspnea early this morning at the facility w/ reported SaO2 in the 50's on her chronic O2 delivery.  Her duaghter is at the bedside and gives a majority of the history of present illness.  She reports that she noticed her mother working a little harder to breathe over the last few days. No reported fever, chills, or cough.  No reported chest pain.  Apparently, they tried to increase the frequency of her nebulizer treatments w/o much success.  This only caused heightened her anxiety.  She doesn't describe outward signs of volume overload.  She maintains a good appetite and PO intake.  She has had no mental status changes from her baseline.  There is no reported bloody diarrhea, nausea, or aspiration.  No reports of afib w/ RVR.  This afternoon, she appears comfortable and reports her breathing is better.  She is mostly bedbound at the facility so no exertional symptoms are reported.      Past Medical History:   Diagnosis Date   • Anemia    • Anxiety    • Atrial fibrillation (CMS/HCC)    • CAD (coronary artery disease)    • CHF (congestive heart failure) (CMS/HCC)    • Chronic kidney disease    • COPD (chronic obstructive pulmonary disease) (CMS/HCC)    • Depression    • Disease of thyroid gland    • Encephalopathy, toxic    • GERD (gastroesophageal reflux disease)    • Herpes simplex    • Hyperlipidemia    • Hypertension    • Iron deficiency    • Lesion of lower extremity     LEFT   • Osteoporosis    • Renal disorder    • Rhabdomyolysis    • Stroke (CMS/HCC)      Past Surgical History:   Procedure Laterality Date   • ABDOMINAL SURGERY     • CARPAL TUNNEL RELEASE     • HYSTERECTOMY     • THYROID SURGERY       Family History   Family history unknown: Yes     Social History      Tobacco Use   • Smoking status: Former Smoker     Packs/day: 0.50     Years: 40.00     Pack years: 20.00     Types: Cigarettes     Last attempt to quit: 2007     Years since quittin.6   Substance Use Topics   • Alcohol use: No     Frequency: Never   • Drug use: No     Medications Prior to Admission   Medication Sig Dispense Refill Last Dose   • acetaminophen (TYLENOL) 500 MG tablet Take 1,000 mg by mouth Every 6 (Six) Hours As Needed for Mild Pain .   2019   • apixaban (ELIQUIS) 2.5 MG tablet tablet Take 2.5 mg by mouth 2 (Two) Times a Day.   2019 at 1800   • bisacodyl (DULCOLAX) 10 MG suppository Insert 10 mg into the rectum Daily As Needed for Constipation.      • bumetanide (BUMEX) 1 MG tablet Take 2 mg by mouth Daily.   2019 at 0800   • bumetanide (BUMEX) 1 MG tablet Take 1 mg by mouth Every Night.   2019 at 1800   • calcium carbonate (TUMS) 500 MG chewable tablet Chew 1 tablet Daily.   2019 at 0800   • carvedilol (COREG) 3.125 MG tablet Take 6.25 mg by mouth 2 (Two) Times a Day.   2019 at 1800   • docusate sodium (COLACE) 250 MG capsule Take 250 mg by mouth Daily.   2019 at 0800   • donepezil (ARICEPT) 10 MG tablet Take 10 mg by mouth Every Night.   2019 at 1800   • ipratropium (ATROVENT) 0.02 % nebulizer solution Take 500 mcg by nebulization 4 (Four) Times a Day As Needed.   Past Week at Unknown time   • lactulose (CHRONULAC) 10 GM/15ML solution Take 20 g by mouth Daily As Needed (constipation).      • levothyroxine (SYNTHROID, LEVOTHROID) 88 MCG tablet Take 88 mcg by mouth Daily.   2019 at 0600   • lisinopril (PRINIVIL,ZESTRIL) 5 MG tablet Take 5 mg by mouth Daily.   2019 at 0800   • LORazepam (ATIVAN) 0.5 MG tablet Take 0.5 mg by mouth 2 (Two) Times a Day.   2019 at 0800   • LORazepam (ATIVAN) 0.5 MG tablet Take 0.5 mg by mouth Daily As Needed for Anxiety.   2019 at 1300   • magnesium oxide (MAGOX) 400 (241.3 Mg) MG tablet tablet Take 400 mg by  "mouth Daily.   7/6/2019 at 0800   • melatonin 5 MG tablet tablet Take 6 mg by mouth Every Night.   7/6/2019 at 2100   • memantine (NAMENDA) 10 MG tablet Take 10 mg by mouth 2 (Two) Times a Day.   7/6/2019 at 1800   • mirtazapine (REMERON) 15 MG tablet Take 15 mg by mouth Every Night.   7/5/2019 at 2100   • Multiple Vitamins-Minerals (MULTIVITAMIN WITH MINERALS) tablet tablet Take 1 tablet by mouth Daily.   7/6/2019 at Unknown time   • oxyCODONE-acetaminophen (PERCOCET) 5-325 MG per tablet Take 1 tablet by mouth Every 4 (Four) Hours As Needed for Moderate Pain  or Severe Pain .   7/6/2019 at Unknown time   • potassium chloride (K-DUR,KLOR-CON) 20 MEQ CR tablet Take 20 mEq by mouth 2 (Two) Times a Day.   7/6/2019 at 1800   • ranitidine (ZANTAC) 150 MG capsule Take 150 mg by mouth 2 (Two) Times a Day.   7/6/2019 at 0800   • sertraline (ZOLOFT) 100 MG tablet Take 125 mg by mouth Daily.   7/6/2019 at 0800   • sodium bicarbonate 650 MG tablet Take 650 mg by mouth Daily.   7/6/2019 at 0800   • vitamin B-12 (CYANOCOBALAMIN) 1000 MCG tablet Take 1,000 mcg by mouth Daily.   7/6/2019 at 0800   • zolpidem (AMBIEN) 10 MG tablet Take 5 mg by mouth Every Night.   7/5/2019 at 2100     Allergies:  Codeine; Feldene [piroxicam]; Norvasc [amlodipine besylate]; and Relafen [nabumetone]    REVIEW OF SYSTEMS:  Please see the above history of present illness for pertinent positives and negatives.  The remainder of the patient's systems have been reviewed and are negative.    Vital Signs  Temp:  [97.5 °F (36.4 °C)-98.4 °F (36.9 °C)] 97.6 °F (36.4 °C)  Heart Rate:  [62-77] 62  Resp:  [14-16] 16  BP: (110-145)/(61-83) 119/70  Oxygen Therapy  SpO2: 96 %  Pulse Oximetry Type: Intermittent  Device (Oxygen Therapy): nasal cannula  Flow (L/min): 4}  Body mass index is 29.82 kg/m².     Flowsheet Rows      First Filed Value   Admission Height  157.5 cm (62\") Documented at 07/07/2019 0151   Admission Weight  78.5 kg (173 lb) Documented at 07/07/2019 " 0151           Physical Exam:  Physical Exam   Constitutional: Elderly appearing female in NAD  HEENT:   Head: Normocephalic and atraumatic.   Eyes:  Pupils are equal, round, and reactive to light. EOM are intact. Sclerae are anicteric and noninjected.  Mouth and Throat: Patient has moist mucous membranes. Oropharynx is clear of any erythema or exudate.     Neck: Neck supple. No JVD present. No thyromegaly present. No lymphadenopathy present.  Cardiovascular: Heart sounds are distant and irregularly, irregular.  S1 and S2 diminished.  Pulmonary/Chest: Lungs are diminished to auscultation bilaterally. No respiratory distress. No wheezes. No rhonchi. No rales.   Abdominal: Soft. Bowel sounds are normal. There is no tenderness.   Musculoskeletal: Normal muscle tone  Extremities: No edema. Radial pulses are palpable and symmetric.  Neurological: Cranial nerves II-XII are grossly intact with no focal deficits.  Skin: Skin is warm. No rash noted. Nails show no clubbing.  No cyanosis or erythema.  Psych: Pleasant affect.    Emotional Behavior:    Judgement and Insight: Unknown   Mental Status:  Alertness  Normal   Memory:  Unknown   Mood and Affect:         Depression  None               Anxiety  None    Debilities:   Physical Weakness  Yes   Handicaps  None   Disabilities  None   Agitation  None     Results Review:    I reviewed the patient's new clinical results.  Lab Results (most recent)     Procedure Component Value Units Date/Time    Troponin [[id number]]  (Normal) Collected:  07/07/19 0836    Specimen:  Blood Updated:  07/07/19 0901     Troponin T <0.010 ng/mL     Narrative:       Troponin T Reference Range:  <= 0.03 ng/mL-   Negative for AMI  >0.03 ng/mL-     Abnormal for myocardial necrosis.  Clinicians would have to utilize clinical acumen, EKG, Troponin and serial changes to determine if it is an Acute Myocardial Infarction or myocardial injury due to an underlying chronic condition.     BNP [[id number]]   (Abnormal) Collected:  07/07/19 0205    Specimen:  Blood Updated:  07/07/19 0238     proBNP 15,695.0 pg/mL     Narrative:       Among patients with dyspnea, NT-proBNP is highly sensitive for the detection of acute congestive heart failure. In addition NT-proBNP of <300 pg/ml effectively rules out acute congestive heart failure with 99% negative predictive value.    Troponin [[id number]]  (Normal) Collected:  07/07/19 0205    Specimen:  Blood Updated:  07/07/19 0237     Troponin T <0.010 ng/mL     Narrative:       Troponin T Reference Range:  <= 0.03 ng/mL-   Negative for AMI  >0.03 ng/mL-     Abnormal for myocardial necrosis.  Clinicians would have to utilize clinical acumen, EKG, Troponin and serial changes to determine if it is an Acute Myocardial Infarction or myocardial injury due to an underlying chronic condition.     Procalcitonin [[id number]]  (Abnormal) Collected:  07/07/19 0205    Specimen:  Blood Updated:  07/07/19 0237     Procalcitonin 0.04 ng/mL     Narrative:       As a Marker for Sepsis (Non-Neonates):   1. <0.5 ng/mL represents a low risk of severe sepsis and/or septic shock.  2. >2 ng/mL represents a high risk of severe sepsis and/or septic shock.    As a Marker for Lower Respiratory Tract Infections that require antibiotic therapy:    PCT on Admission     Antibiotic Therapy       6-12 Hrs later  > 0.5                Strongly Recommended             >0.25 - <0.5         Recommended  0.1 - 0.25           Discouraged              Remeasure/reassess PCT  <0.1                 Strongly Discouraged     Remeasure/reassess PCT                     PCT values of < 0.5 ng/mL do not exclude an infection, because localized infections (without systemic signs) may be associated with such low concentrations, or a systemic infection in its initial stages (< 6 hours). Furthermore, increased PCT can occur without infection. PCT concentrations between 0.5 and 2.0 ng/mL should be interpreted taking into account the  patient's history. It is recommended to retest PCT within 6-24 hours if any concentrations < 2 ng/mL are obtained.    Comprehensive Metabolic Panel [[id number]]  (Abnormal) Collected:  07/07/19 0205    Specimen:  Blood Updated:  07/07/19 0237     Glucose 96 mg/dL      BUN 35 mg/dL      Creatinine 1.65 mg/dL      Sodium 144 mmol/L      Potassium 4.9 mmol/L      Chloride 106 mmol/L      CO2 25.9 mmol/L      Calcium 9.7 mg/dL      Total Protein 6.3 g/dL      Albumin 3.40 g/dL      ALT (SGPT) 6 U/L      AST (SGOT) 15 U/L      Alkaline Phosphatase 90 U/L      Total Bilirubin 0.3 mg/dL      eGFR Non African Amer 29 mL/min/1.73      Globulin 2.9 gm/dL      A/G Ratio 1.2 g/dL      BUN/Creatinine Ratio 21.2     Anion Gap 12.1 mmol/L     Narrative:       GFR Normal >60  Chronic Kidney Disease <60  Kidney Failure <15    Protime-INR [[id number]]  (Abnormal) Collected:  07/07/19 0205    Specimen:  Blood Updated:  07/07/19 0221     Protime 15.0 Seconds      INR 1.21    Narrative:       Therapeutic Ranges for INR: 2.0-3.0 (PT 20-30)                              2.5-3.5 (PT 25-34)    aPTT [[id number]]  (Abnormal) Collected:  07/07/19 0205    Specimen:  Blood Updated:  07/07/19 0221     PTT 50.6 seconds     Narrative:       PTT = The equivalent PTT values for the therapeutic range of heparin levels at 0.1 to 0.7 U/ml are 53 to 110 seconds.    CBC & Differential [[id number]] Collected:  07/07/19 0205    Specimen:  Blood Updated:  07/07/19 0211    Narrative:       The following orders were created for panel order CBC & Differential.  Procedure                               Abnormality         Status                     ---------                               -----------         ------                     CBC Auto Differential[[id number]]        Abnormal            Final result                 Please view results for these tests on the individual orders.    CBC Auto Differential [[id number]]  (Abnormal) Collected:  07/07/19 0205    Specimen:   Blood Updated:  07/07/19 0211     WBC 4.35 10*3/mm3      RBC 3.32 10*6/mm3      Hemoglobin 10.5 g/dL      Hematocrit 33.8 %      .8 fL      MCH 31.6 pg      MCHC 31.1 g/dL      RDW 15.2 %      RDW-SD 56.5 fl      MPV 12.3 fL      Platelets 168 10*3/mm3      Neutrophil % 58.6 %      Lymphocyte % 19.1 %      Monocyte % 17.5 %      Eosinophil % 3.9 %      Basophil % 0.2 %      Immature Grans % 0.7 %      Neutrophils, Absolute 2.55 10*3/mm3      Lymphocytes, Absolute 0.83 10*3/mm3      Monocytes, Absolute 0.76 10*3/mm3      Eosinophils, Absolute 0.17 10*3/mm3      Basophils, Absolute 0.01 10*3/mm3      Immature Grans, Absolute 0.03 10*3/mm3      nRBC 0.0 /100 WBC           Imaging Results (most recent)     Procedure Component Value Units Date/Time    XR Chest 2 View [[id number]] Collected:  07/07/19 0237     Updated:  07/07/19 0239    Narrative:       CR Chest 2 Vws    INDICATION:    Increasing shortness or hair over the last 3 days    COMPARISON:    5/13/2019    FINDINGS:   PA and lateral views of the chest.  There appear to be bilateral right greater than left small to moderate effusions and mild bibasilar atelectasis. Effusions best seen on lateral view. The upper lobes are clear. Heart size is mildly prominent. There are  degenerative changes in the right shoulder        Impression:       Increased density in both lung bases suggest right greater than left effusions with bibasilar atelectasis.    Signer Name: Watson Salazar MD   Signed: 7/7/2019 2:37 AM   Workstation Name: [id number]           reviewed    ECG/EMG Results (most recent)     Procedure Component Value Units Date/Time    ECG 12 Lead [[id number]] Collected:  07/07/19 0201     Updated:  07/07/19 0203    Narrative:       HEART RATE= 65  bpm  RR Interval= 925  ms  KS Interval=   ms  P Horizontal Axis=   deg  P Front Axis=   deg  QRSD Interval= 96  ms  QT Interval= 428  ms  QRS Axis= 0  deg  T Wave Axis= 147  deg  - ABNORMAL ECG -  Atrial  fibrillation  Nonspecific T abnrm, anterolateral leads  Electronically Signed By:   Date and Time of Study: 2019-07-07 02:01:41        Reviewed and compared to May 2019    Assessment/Plan     1.  Acute-on-chronic Hypoxic Respiratory Failure: Normally wears 2L at the facility.  I've decreased her to 3L.  I suspect this is heart failure related given the history, imaging, and laboratory data.  I have excluded an AMI, but she has lateral t-wave inversions that are new since May.  Last echo was 2015 so will repeat.  I've increased her Bumex to TID dosing of 2mg.  Procalcitonin repeat in pending, but bacterial infection seems unlikely.  Will monitor.  Pending echo results, may have Cardiology see her.    2.  HTN: BP at goal on exam.  Monitor closely as she diureses.      3.  Hypothyroidism: TSH pending.  On home regimen.    4.  Dementia: Pleasant on exam, and at baseline per family.  Continue current regimen.    5.  Chronic Afib: Rate at goal.  AC w/ Eliquis.  No change.    6.   CKD: Creatinine appears about baseline.  Continue to monitor while on diuresis.      I discussed the patients findings and my recommendations with patient and her daughter.     Marco Laureano MD  07/07/19  2:58 PM

## 2019-07-07 NOTE — PLAN OF CARE
Problem: Patient Care Overview  Goal: Plan of Care Review   07/07/19 5968   Coping/Psychosocial   Plan of Care Reviewed With patient   Plan of Care Review   Progress no change   OTHER   Outcome Summary VSS no complaints of pain today. Several wet breifs. Patient took IV out. MD made award. D/C's potassium, modified Bumex order 2 mg nightly.

## 2019-07-07 NOTE — ED NOTES
"When pt was asked if she was doing well,, pt responded \"I recon.\"  She states \"my leg gives me trouble from time to time. My arm is hurting a little but it's okay.\"     Isabell Rojas RN  07/07/19 0249    "

## 2019-07-08 ENCOUNTER — APPOINTMENT (OUTPATIENT)
Dept: CT IMAGING | Facility: HOSPITAL | Age: 84
End: 2019-07-08

## 2019-07-08 ENCOUNTER — APPOINTMENT (OUTPATIENT)
Dept: CARDIOLOGY | Facility: HOSPITAL | Age: 84
End: 2019-07-08

## 2019-07-08 LAB
ANION GAP SERPL CALCULATED.3IONS-SCNC: 9.3 MMOL/L (ref 5–15)
BASOPHILS # BLD AUTO: 0.04 10*3/MM3 (ref 0–0.2)
BASOPHILS NFR BLD AUTO: 1 % (ref 0–1.5)
BH CV ECHO MEAS - AO MAX PG (FULL): 2.7 MMHG
BH CV ECHO MEAS - AO MAX PG: 5 MMHG
BH CV ECHO MEAS - AO MEAN PG (FULL): 2 MMHG
BH CV ECHO MEAS - AO MEAN PG: 3 MMHG
BH CV ECHO MEAS - AO ROOT AREA (BSA CORRECTED): 2.1
BH CV ECHO MEAS - AO ROOT AREA: 10.2 CM^2
BH CV ECHO MEAS - AO ROOT DIAM: 3.6 CM
BH CV ECHO MEAS - AO V2 MAX: 112 CM/SEC
BH CV ECHO MEAS - AO V2 MEAN: 79 CM/SEC
BH CV ECHO MEAS - AO V2 VTI: 21.7 CM
BH CV ECHO MEAS - AVA(I,A): 1.9 CM^2
BH CV ECHO MEAS - AVA(I,D): 1.9 CM^2
BH CV ECHO MEAS - AVA(V,A): 1.9 CM^2
BH CV ECHO MEAS - AVA(V,D): 1.9 CM^2
BH CV ECHO MEAS - BSA(HAYCOCK): 1.8 M^2
BH CV ECHO MEAS - BSA: 1.8 M^2
BH CV ECHO MEAS - BZI_BMI: 29.8 KILOGRAMS/M^2
BH CV ECHO MEAS - BZI_METRIC_HEIGHT: 157.5 CM
BH CV ECHO MEAS - BZI_METRIC_WEIGHT: 73.9 KG
BH CV ECHO MEAS - EDV(CUBED): 81.7 ML
BH CV ECHO MEAS - EDV(MOD-SP2): 57.9 ML
BH CV ECHO MEAS - EDV(MOD-SP4): 84.5 ML
BH CV ECHO MEAS - EDV(TEICH): 84.9 ML
BH CV ECHO MEAS - EF(CUBED): 65.3 %
BH CV ECHO MEAS - EF(MOD-BP): 57 %
BH CV ECHO MEAS - EF(MOD-SP2): 57.3 %
BH CV ECHO MEAS - EF(MOD-SP4): 62.2 %
BH CV ECHO MEAS - EF(TEICH): 57.1 %
BH CV ECHO MEAS - ESV(CUBED): 28.4 ML
BH CV ECHO MEAS - ESV(MOD-SP2): 24.7 ML
BH CV ECHO MEAS - ESV(MOD-SP4): 31.9 ML
BH CV ECHO MEAS - ESV(TEICH): 36.4 ML
BH CV ECHO MEAS - FS: 29.7 %
BH CV ECHO MEAS - IVS/LVPW: 1.1
BH CV ECHO MEAS - IVSD: 1.2 CM
BH CV ECHO MEAS - LA DIMENSION: 4.3 CM
BH CV ECHO MEAS - LA/AO: 1.2
BH CV ECHO MEAS - LV DIASTOLIC VOL/BSA (35-75): 48.2 ML/M^2
BH CV ECHO MEAS - LV MASS(C)D: 181.7 GRAMS
BH CV ECHO MEAS - LV MASS(C)DI: 103.7 GRAMS/M^2
BH CV ECHO MEAS - LV MAX PG: 2.3 MMHG
BH CV ECHO MEAS - LV MEAN PG: 1 MMHG
BH CV ECHO MEAS - LV SYSTOLIC VOL/BSA (12-30): 18.2 ML/M^2
BH CV ECHO MEAS - LV V1 MAX: 76.2 CM/SEC
BH CV ECHO MEAS - LV V1 MEAN: 45.3 CM/SEC
BH CV ECHO MEAS - LV V1 VTI: 14.6 CM
BH CV ECHO MEAS - LVIDD: 4.3 CM
BH CV ECHO MEAS - LVIDS: 3.1 CM
BH CV ECHO MEAS - LVLD AP2: 6.3 CM
BH CV ECHO MEAS - LVLD AP4: 6.6 CM
BH CV ECHO MEAS - LVLS AP2: 4.9 CM
BH CV ECHO MEAS - LVLS AP4: 6.3 CM
BH CV ECHO MEAS - LVOT AREA (M): 2.8 CM^2
BH CV ECHO MEAS - LVOT AREA: 2.8 CM^2
BH CV ECHO MEAS - LVOT DIAM: 1.9 CM
BH CV ECHO MEAS - LVPWD: 1.1 CM
BH CV ECHO MEAS - MV DEC SLOPE: 740 CM/SEC^2
BH CV ECHO MEAS - MV DEC TIME: 0.2 SEC
BH CV ECHO MEAS - MV E MAX VEL: 113 CM/SEC
BH CV ECHO MEAS - MV MAX PG: 7.2 MMHG
BH CV ECHO MEAS - MV MEAN PG: 2 MMHG
BH CV ECHO MEAS - MV P1/2T MAX VEL: 144 CM/SEC
BH CV ECHO MEAS - MV P1/2T: 57 MSEC
BH CV ECHO MEAS - MV V2 MAX: 134 CM/SEC
BH CV ECHO MEAS - MV V2 MEAN: 64 CM/SEC
BH CV ECHO MEAS - MV V2 VTI: 26.3 CM
BH CV ECHO MEAS - MVA P1/2T LCG: 1.5 CM^2
BH CV ECHO MEAS - MVA(P1/2T): 3.9 CM^2
BH CV ECHO MEAS - MVA(VTI): 1.6 CM^2
BH CV ECHO MEAS - PA ACC TIME: 0.07 SEC
BH CV ECHO MEAS - PA MAX PG (FULL): 1.3 MMHG
BH CV ECHO MEAS - PA MAX PG: 2.7 MMHG
BH CV ECHO MEAS - PA PR(ACCEL): 45.7 MMHG
BH CV ECHO MEAS - PA V2 MAX: 81.9 CM/SEC
BH CV ECHO MEAS - PULM A REVS DUR: 0.16 SEC
BH CV ECHO MEAS - PULM A REVS VEL: 19.4 CM/SEC
BH CV ECHO MEAS - PULM DIAS VEL: 23.5 CM/SEC
BH CV ECHO MEAS - PULM S/D: 1.1
BH CV ECHO MEAS - PULM SYS VEL: 24.8 CM/SEC
BH CV ECHO MEAS - PVA(V,A): 2.7 CM^2
BH CV ECHO MEAS - PVA(V,D): 2.7 CM^2
BH CV ECHO MEAS - QP/QS: 1.2
BH CV ECHO MEAS - RAP SYSTOLE: 8 MMHG
BH CV ECHO MEAS - RV MAX PG: 1.4 MMHG
BH CV ECHO MEAS - RV MEAN PG: 1 MMHG
BH CV ECHO MEAS - RV V1 MAX: 59.1 CM/SEC
BH CV ECHO MEAS - RV V1 MEAN: 40.9 CM/SEC
BH CV ECHO MEAS - RV V1 VTI: 12.8 CM
BH CV ECHO MEAS - RVOT AREA: 3.8 CM^2
BH CV ECHO MEAS - RVOT DIAM: 2.2 CM
BH CV ECHO MEAS - RVSP: 32 MMHG
BH CV ECHO MEAS - SI(AO): 126 ML/M^2
BH CV ECHO MEAS - SI(CUBED): 30.5 ML/M^2
BH CV ECHO MEAS - SI(LVOT): 23.6 ML/M^2
BH CV ECHO MEAS - SI(MOD-SP2): 18.9 ML/M^2
BH CV ECHO MEAS - SI(MOD-SP4): 30 ML/M^2
BH CV ECHO MEAS - SI(TEICH): 27.7 ML/M^2
BH CV ECHO MEAS - SV(AO): 220.9 ML
BH CV ECHO MEAS - SV(CUBED): 53.4 ML
BH CV ECHO MEAS - SV(LVOT): 41.4 ML
BH CV ECHO MEAS - SV(MOD-SP2): 33.2 ML
BH CV ECHO MEAS - SV(MOD-SP4): 52.6 ML
BH CV ECHO MEAS - SV(RVOT): 48.7 ML
BH CV ECHO MEAS - SV(TEICH): 48.5 ML
BH CV ECHO MEAS - TR MAX VEL: 284 CM/SEC
BH CV VAS BP RIGHT ARM: NORMAL MMHG
BUN BLD-MCNC: 36 MG/DL (ref 8–23)
BUN/CREAT SERPL: 21.7 (ref 7–25)
CALCIUM SPEC-SCNC: 9.3 MG/DL (ref 8.2–9.6)
CHLORIDE SERPL-SCNC: 107 MMOL/L (ref 98–107)
CO2 SERPL-SCNC: 28.7 MMOL/L (ref 22–29)
CREAT BLD-MCNC: 1.66 MG/DL (ref 0.57–1)
DEPRECATED RDW RBC AUTO: 58 FL (ref 37–54)
EOSINOPHIL # BLD AUTO: 0.11 10*3/MM3 (ref 0–0.4)
EOSINOPHIL NFR BLD AUTO: 2.9 % (ref 0.3–6.2)
ERYTHROCYTE [DISTWIDTH] IN BLOOD BY AUTOMATED COUNT: 15.1 % (ref 12.3–15.4)
GFR SERPL CREATININE-BSD FRML MDRD: 29 ML/MIN/1.73
GLUCOSE BLD-MCNC: 71 MG/DL (ref 65–99)
HCT VFR BLD AUTO: 30.3 % (ref 34–46.6)
HGB BLD-MCNC: 9.2 G/DL (ref 12–15.9)
IMM GRANULOCYTES # BLD AUTO: 0.01 10*3/MM3 (ref 0–0.05)
IMM GRANULOCYTES NFR BLD AUTO: 0.3 % (ref 0–0.5)
LEFT ATRIUM VOLUME INDEX: 71 ML/M2
LYMPHOCYTES # BLD AUTO: 0.73 10*3/MM3 (ref 0.7–3.1)
LYMPHOCYTES NFR BLD AUTO: 19 % (ref 19.6–45.3)
MAGNESIUM SERPL-MCNC: 2 MG/DL (ref 1.7–2.3)
MAXIMAL PREDICTED HEART RATE: 127 BPM
MCH RBC QN AUTO: 31.7 PG (ref 26.6–33)
MCHC RBC AUTO-ENTMCNC: 30.4 G/DL (ref 31.5–35.7)
MCV RBC AUTO: 104.5 FL (ref 79–97)
MONOCYTES # BLD AUTO: 0.64 10*3/MM3 (ref 0.1–0.9)
MONOCYTES NFR BLD AUTO: 16.7 % (ref 5–12)
NEUTROPHILS # BLD AUTO: 2.31 10*3/MM3 (ref 1.7–7)
NEUTROPHILS NFR BLD AUTO: 60.1 % (ref 42.7–76)
PLATELET # BLD AUTO: 123 10*3/MM3 (ref 140–450)
PMV BLD AUTO: 12.6 FL (ref 6–12)
POTASSIUM BLD-SCNC: 4.5 MMOL/L (ref 3.5–5.2)
RBC # BLD AUTO: 2.9 10*6/MM3 (ref 3.77–5.28)
SODIUM BLD-SCNC: 145 MMOL/L (ref 136–145)
STRESS TARGET HR: 108 BPM
WBC NRBC COR # BLD: 3.84 10*3/MM3 (ref 3.4–10.8)

## 2019-07-08 PROCEDURE — 93306 TTE W/DOPPLER COMPLETE: CPT | Performed by: INTERNAL MEDICINE

## 2019-07-08 PROCEDURE — 94799 UNLISTED PULMONARY SVC/PX: CPT

## 2019-07-08 PROCEDURE — 93306 TTE W/DOPPLER COMPLETE: CPT

## 2019-07-08 PROCEDURE — 85025 COMPLETE CBC W/AUTO DIFF WBC: CPT | Performed by: HOSPITALIST

## 2019-07-08 PROCEDURE — 99232 SBSQ HOSP IP/OBS MODERATE 35: CPT | Performed by: HOSPITALIST

## 2019-07-08 PROCEDURE — 71250 CT THORAX DX C-: CPT

## 2019-07-08 PROCEDURE — 80048 BASIC METABOLIC PNL TOTAL CA: CPT | Performed by: HOSPITALIST

## 2019-07-08 PROCEDURE — 83735 ASSAY OF MAGNESIUM: CPT | Performed by: HOSPITALIST

## 2019-07-08 RX ORDER — BUMETANIDE 0.25 MG/ML
1 INJECTION INTRAMUSCULAR; INTRAVENOUS EVERY 12 HOURS
Status: DISCONTINUED | OUTPATIENT
Start: 2019-07-08 | End: 2019-07-09

## 2019-07-08 RX ORDER — CARVEDILOL 3.12 MG/1
3.12 TABLET ORAL 2 TIMES DAILY WITH MEALS
Status: DISCONTINUED | OUTPATIENT
Start: 2019-07-08 | End: 2019-07-12 | Stop reason: HOSPADM

## 2019-07-08 RX ORDER — FAMOTIDINE 20 MG/1
20 TABLET, FILM COATED ORAL DAILY
Status: DISCONTINUED | OUTPATIENT
Start: 2019-07-09 | End: 2019-07-12 | Stop reason: HOSPADM

## 2019-07-08 RX ADMIN — OXYCODONE HYDROCHLORIDE AND ACETAMINOPHEN 1 TABLET: 5; 325 TABLET ORAL at 03:28

## 2019-07-08 RX ADMIN — FAMOTIDINE 20 MG: 20 TABLET, FILM COATED ORAL at 09:12

## 2019-07-08 RX ADMIN — OXYCODONE HYDROCHLORIDE AND ACETAMINOPHEN 1 TABLET: 5; 325 TABLET ORAL at 14:36

## 2019-07-08 RX ADMIN — BUMETANIDE 2 MG: 1 TABLET ORAL at 09:11

## 2019-07-08 RX ADMIN — APIXABAN 2.5 MG: 2.5 TABLET, FILM COATED ORAL at 20:15

## 2019-07-08 RX ADMIN — LISINOPRIL 5 MG: 5 TABLET ORAL at 09:12

## 2019-07-08 RX ADMIN — OXYCODONE HYDROCHLORIDE AND ACETAMINOPHEN 1 TABLET: 5; 325 TABLET ORAL at 09:11

## 2019-07-08 RX ADMIN — MEMANTINE HYDROCHLORIDE 10 MG: 5 TABLET ORAL at 09:12

## 2019-07-08 RX ADMIN — MELATONIN TAB 5 MG 5 MG: 5 TAB at 20:14

## 2019-07-08 RX ADMIN — MIRTAZAPINE 15 MG: 15 TABLET, FILM COATED ORAL at 20:16

## 2019-07-08 RX ADMIN — LEVOTHYROXINE SODIUM 88 MCG: 0.09 TABLET ORAL at 09:12

## 2019-07-08 RX ADMIN — BUMETANIDE 1 MG: 0.25 INJECTION INTRAMUSCULAR; INTRAVENOUS at 19:50

## 2019-07-08 RX ADMIN — IPRATROPIUM BROMIDE 0.5 MG: 0.5 SOLUTION RESPIRATORY (INHALATION) at 14:37

## 2019-07-08 RX ADMIN — OXYCODONE HYDROCHLORIDE AND ACETAMINOPHEN 1 TABLET: 5; 325 TABLET ORAL at 19:31

## 2019-07-08 RX ADMIN — LORAZEPAM 0.5 MG: 0.5 TABLET ORAL at 09:11

## 2019-07-08 RX ADMIN — SODIUM BICARBONATE 650 MG TABLET 650 MG: at 09:11

## 2019-07-08 RX ADMIN — LORAZEPAM 0.5 MG: 0.5 TABLET ORAL at 20:13

## 2019-07-08 RX ADMIN — IPRATROPIUM BROMIDE 0.5 MG: 0.5 SOLUTION RESPIRATORY (INHALATION) at 03:06

## 2019-07-08 RX ADMIN — MEMANTINE HYDROCHLORIDE 10 MG: 5 TABLET ORAL at 20:14

## 2019-07-08 RX ADMIN — SERTRALINE HYDROCHLORIDE 125 MG: 50 TABLET ORAL at 09:12

## 2019-07-08 RX ADMIN — DOCUSATE SODIUM 200 MG: 100 CAPSULE, LIQUID FILLED ORAL at 09:12

## 2019-07-08 RX ADMIN — DONEPEZIL HYDROCHLORIDE 10 MG: 5 TABLET, FILM COATED ORAL at 20:13

## 2019-07-08 RX ADMIN — CARVEDILOL 3.12 MG: 6.25 TABLET, FILM COATED ORAL at 20:15

## 2019-07-08 RX ADMIN — APIXABAN 2.5 MG: 2.5 TABLET, FILM COATED ORAL at 09:11

## 2019-07-08 RX ADMIN — BUMETANIDE 2 MG: 1 TABLET ORAL at 18:07

## 2019-07-08 NOTE — PLAN OF CARE
Problem: Patient Care Overview  Goal: Discharge Needs Assessment  Outcome: Ongoing (interventions implemented as appropriate)   07/07/19 0433 07/07/19 0438 07/08/19 4601   Disability   Equipment Currently Used at Home walker, rolling --  --    Discharge Needs Assessment   Readmission Within the Last 30 Days --  --  no previous admission in last 30 days   Concerns to be Addressed --  --  discharge planning   Patient/Family Anticipates Transition to --  --  long term care facility   Patient/Family Anticipated Services at Transition --   --    Transportation Anticipated --  --  health plan transportation

## 2019-07-08 NOTE — PLAN OF CARE
Problem: Patient Care Overview  Goal: Plan of Care Review  Outcome: Ongoing (interventions implemented as appropriate)   07/08/19 0342   Coping/Psychosocial   Plan of Care Reviewed With patient   Plan of Care Review   Progress no change       Problem: Fall Risk (Adult)  Goal: Absence of Fall  Outcome: Ongoing (interventions implemented as appropriate)   07/08/19 0342   Fall Risk (Adult)   Absence of Fall making progress toward outcome       Problem: Pain, Chronic (Adult)  Goal: Acceptable Pain/Comfort Level and Functional Ability  Outcome: Ongoing (interventions implemented as appropriate)   07/08/19 0342   Pain, Chronic (Adult)   Acceptable Pain/Comfort Level and Functional Ability making progress toward outcome       Problem: Breathing Pattern Ineffective (Adult)  Goal: Effective Oxygenation/Ventilation  Outcome: Ongoing (interventions implemented as appropriate)   07/08/19 0342   Breathing Pattern Ineffective (Adult)   Effective Oxygenation/Ventilation making progress toward outcome     Goal: Anxiety/Fear Reduction  Outcome: Ongoing (interventions implemented as appropriate)   07/08/19 0342   Breathing Pattern Ineffective (Adult)   Anxiety/Fear Reduction making progress toward outcome

## 2019-07-08 NOTE — NURSING NOTE
Discharge Planning Assessment  BETY Coleman     Patient Name: Amparo Vergara  MRN: [id number]  Today's Date: 7/8/2019    Admit Date: 7/7/2019    Discharge Needs Assessment     Row Name 07/08/19 1501       Living Environment    Lives With  facility resident    Current Living Arrangements  extended care facility;residential facility    Primary Care Provided by  other (see comments) Nursing home staff    Provides Primary Care For  no one, unable/limited ability to care for self    Family Caregiver if Needed  child(lyndsay), adult    Quality of Family Relationships  helpful    Able to Return to Prior Arrangements  yes       Transition Planning    Patient/Family Anticipates Transition to  long term care facility    Transportation Anticipated  health plan transportation       Discharge Needs Assessment    Readmission Within the Last 30 Days  no previous admission in last 30 days    Concerns to be Addressed  discharge planning        Discharge Plan     Row Name 07/08/19 1509       Plan    Plan  Return to [facility]    Patient/Family in Agreement with Plan  yes    Plan Comments  Spoke with Mrs Vergara at bedside.  She is oriented to name and place.  She is a resident at [facility].  She needs some help with ADL's.  She has an advanced directive on file here at the hospital.  Plan is to return to [facility].  Will continue to follow        Destination      No service coordination in this encounter.      Durable Medical Equipment      No service coordination in this encounter.      Dialysis/Infusion      No service coordination in this encounter.      Home Medical Care      No service coordination in this encounter.      Therapy      No service coordination in this encounter.      Community Resources      No service coordination in this encounter.          Demographic Summary     Row Name 07/08/19 3848       General Information    Admission Type  inpatient    Arrived From  long term care    Referral Source   admission list    Reason for Consult  discharge planning    Preferred Language  English     Used During This Interaction  no        Functional Status    No documentation.       Psychosocial    No documentation.       Abuse/Neglect    No documentation.       Legal    No documentation.       Substance Abuse    No documentation.       Patient Forms    No documentation.           Jess Vargas RN

## 2019-07-08 NOTE — PROGRESS NOTES
"Hospitalist Team      Patient Care Team:  Nikita Castellon MD as PCP - General (Family Medicine)  Provider, No Known as PCP - Family Medicine        Chief Complaint:   Acute on chronic hypoxic respiratory failure and suspected congestive heart failure    Subjective    Interval History and ROS:     The patient complains of shortness of breath today.  She denies cough.  She denies any nausea or vomiting, lightheadedness or heart palpitations.  She does not feel like her shortness of air has improved from the prior day.  He does have some memory deficits noted in conversation.  Patient is afebrile, blood pressure and heart rate are within normal limits.  The patient is on a 4 L nasal cannula here and normally wears a 2 L nasal cannula at the nursing home.      Objective    Vital Signs  Temp:  [96.9 °F (36.1 °C)-98.4 °F (36.9 °C)] 97.3 °F (36.3 °C)  Heart Rate:  [39-88] 62  Resp:  [16-32] 16  BP: (100-123)/(65-72) 123/65  Oxygen Therapy  SpO2: 97 %  Pulse Oximetry Type: Intermittent  Device (Oxygen Therapy): nasal cannula  Flow (L/min): 4     Flowsheet Rows      First Filed Value   Admission Height  157.5 cm (62\") Documented at 07/07/2019 0151   Admission Weight  78.5 kg (173 lb) Documented at 07/07/2019 0151            Physical Exam:    Physical Exam   Constitutional: She appears well-developed. No distress.   Elderly female   HENT:   Head: Normocephalic and atraumatic.   Mouth/Throat: Oropharynx is clear and moist.   Eyes: Conjunctivae and EOM are normal. No scleral icterus.   Neck: Neck supple. No JVD present.   Cardiovascular: Normal rate. Exam reveals no friction rub.   No murmur heard.  Irregular rhythm   Pulmonary/Chest: She has no wheezes. She has no rales.   Diminished breath sounds at the bilateral bases right greater than the left.  Slight increased work of breathing especially when the patient talks.   Abdominal: Soft. Bowel sounds are normal. There is no tenderness.   Musculoskeletal: She exhibits no edema. "   Neurological: She is alert.   Oriented x2   Skin: Skin is warm and dry. No erythema.       Results Review:     I reviewed the patient's new clinical results.    Lab Results (last 24 hours)     Procedure Component Value Units Date/Time    Procalcitonin [[id number]]  (Abnormal) Collected:  07/07/19 0836    Specimen:  Blood Updated:  07/07/19 1744     Procalcitonin 0.05 ng/mL     Narrative:       As a Marker for Sepsis (Non-Neonates):   1. <0.5 ng/mL represents a low risk of severe sepsis and/or septic shock.  2. >2 ng/mL represents a high risk of severe sepsis and/or septic shock.    As a Marker for Lower Respiratory Tract Infections that require antibiotic therapy:    PCT on Admission     Antibiotic Therapy       6-12 Hrs later  > 0.5                Strongly Recommended             >0.25 - <0.5         Recommended  0.1 - 0.25           Discouraged              Remeasure/reassess PCT  <0.1                 Strongly Discouraged     Remeasure/reassess PCT                     PCT values of < 0.5 ng/mL do not exclude an infection, because localized infections (without systemic signs) may be associated with such low concentrations, or a systemic infection in its initial stages (< 6 hours). Furthermore, increased PCT can occur without infection. PCT concentrations between 0.5 and 2.0 ng/mL should be interpreted taking into account the patient's history. It is recommended to retest PCT within 6-24 hours if any concentrations < 2 ng/mL are obtained.    TSH [[id number]]  (Normal) Collected:  07/07/19 0836    Specimen:  Blood Updated:  07/07/19 1744     TSH 2.190 mIU/mL     Troponin [[id number]]  (Normal) Collected:  07/07/19 0836    Specimen:  Blood Updated:  07/07/19 0901     Troponin T <0.010 ng/mL     Narrative:       Troponin T Reference Range:  <= 0.03 ng/mL-   Negative for AMI  >0.03 ng/mL-     Abnormal for myocardial necrosis.  Clinicians would have to utilize clinical acumen, EKG, Troponin and serial changes to determine  if it is an Acute Myocardial Infarction or myocardial injury due to an underlying chronic condition.           Imaging Results (last 24 hours)     ** No results found for the last 24 hours. **        ECG/EMG Results (most recent)     Procedure Component Value Units Date/Time    ECG 12 Lead [[id number]] Collected:  07/07/19 0201     Updated:  07/07/19 0203    Narrative:       HEART RATE= 65  bpm  RR Interval= 925  ms  [place] Interval=   ms  P Horizontal Axis=   deg  P Front Axis=   deg  QRSD Interval= 96  ms  QT Interval= 428  ms  QRS Axis= 0  deg  T Wave Axis= 147  deg  - ABNORMAL ECG -  Atrial fibrillation  Nonspecific T abnrm, anterolateral leads  Electronically Signed By:   Date and Time of Study: 2019-07-07 02:01:41          Medication Review:   I have reviewed the patient's current medication list    Current Facility-Administered Medications:   •  acetaminophen (TYLENOL) tablet 1,000 mg, 1,000 mg, Oral, Q6H PRN, Ashu Rivera MD  •  apixaban (ELIQUIS) tablet 2.5 mg, 2.5 mg, Oral, Q12H, Ashu Rivera MD, 2.5 mg at 07/07/19 2046  •  bisacodyl (DULCOLAX) suppository 10 mg, 10 mg, Rectal, Daily PRN, Ashu Rivera MD  •  bumetanide (BUMEX) tablet 2 mg, 2 mg, Oral, TID, Marco Laureano MD, 2 mg at 07/07/19 2053  •  docusate sodium (COLACE) capsule 200 mg, 200 mg, Oral, Daily, Ashu Rivera MD, 200 mg at 07/07/19 0825  •  donepezil (ARICEPT) tablet 10 mg, 10 mg, Oral, Nightly, Ashu Rivera MD, 10 mg at 07/07/19 2046  •  famotidine (PEPCID) tablet 20 mg, 20 mg, Oral, BID AC, Ashu Rivera MD, 20 mg at 07/07/19 1848  •  ipratropium (ATROVENT) nebulizer solution 0.5 mg, 500 mcg, Nebulization, 4x Daily PRN, Ashu Rivera MD, 0.5 mg at 07/08/19 0306  •  levothyroxine (SYNTHROID, LEVOTHROID) tablet 88 mcg, 88 mcg, Oral, Daily, Ashu Rivera MD, 88 mcg at 07/07/19 0826  •  lisinopril (PRINIVIL,ZESTRIL) tablet 5 mg, 5 mg, Oral, Daily, Ashu Rivera MD, 5 mg at 07/07/19 0826  •   LORazepam (ATIVAN) tablet 0.5 mg, 0.5 mg, Oral, BID, Ashu Rivera MD, 0.5 mg at 07/07/19 2053  •  melatonin tablet 5 mg, 5 mg, Oral, Nightly, Ashu Rivera MD, 5 mg at 07/07/19 2053  •  memantine (NAMENDA) tablet 10 mg, 10 mg, Oral, Q12H, Ashu Rivera MD, 10 mg at 07/07/19 2046  •  mirtazapine (REMERON) tablet 15 mg, 15 mg, Oral, Nightly, Ashu Rivera MD, 15 mg at 07/07/19 2046  •  oxyCODONE-acetaminophen (PERCOCET) 5-325 MG per tablet 1 tablet, 1 tablet, Oral, Q4H PRN, Ashu Rivera MD, 1 tablet at 07/08/19 0328  •  sertraline (ZOLOFT) tablet 125 mg, 125 mg, Oral, Daily, Ashu Rivera MD, 125 mg at 07/07/19 0827  •  sodium bicarbonate tablet 650 mg, 650 mg, Oral, Daily, Ashu Rivera MD, 650 mg at 07/07/19 0828  •  [COMPLETED] Insert peripheral IV, , , Once **AND** sodium chloride 0.9 % flush 10 mL, 10 mL, Intravenous, PRN, London Bai MD      Assessment/Plan     -Acute on chronic hypoxic respiratory failure: Secondary to suspected acute congestive heart failure bilateral effusions.  Titrate down oxygen as tolerated.  2 L nasal cannula at baseline.    -Suspected acute on chronic diastolic congestive heart failure: Will ask cardiology to see the patient here.  Will change oral Bumex to IV every 12 hours.  Monitor renal function closely.  Monitor daily weights and I's and O's.  Echo today with normal ejection fraction.  CT of the chest shows moderate sized bilateral pleural effusions with associated atelectasis.  Will add incentive spirometry.  Will await further cardiology recommendations.  Procalcitonin here was negative, no signs of acute infectious process currently.    -Chronic atrial fibrillation: The patient is anticoagulated with Eliquis.  Her rate is within normal limits.    -Hypertension: Blood pressure is mostly within normal limits.  Patient is on her home dose of lisinopril, will resume her prior home dose of Coreg.  Monitor with  diuresis.    -Hypothyroidism: TSH is within normal limits, home supplementation continued.    -Dementia: Mental status appears at baseline here.  Patient continued on her home dose of Aricept, Namenda, and Remeron.      -Anxiety and depression: Patient on her home dose of Ativan, Remeron, and Zoloft.  No acute issues here.    -Probable acute kidney injury on chronic kidney disease stage III: Baseline creatinine appears to be approximately 1.4.  She is 1.66 today.  This may be secondary to her congestive heart failure.  Will monitor very closely with increased diuresis.  Patient on her home dose of oral bicarbonate.    -Dyslipidemia: On no medications for this currently.  In this elderly female likely risks outweigh benefits.    -COPD: We will have nursing check with the nursing home to see if the patient had breathing treatments there.  No wheezes on exam here.    Plan for disposition: Back to [facility] when able    Lulú Lorenzo MD  07/08/19  7:46 AM

## 2019-07-09 PROBLEM — N17.9 ACUTE ON CHRONIC RENAL FAILURE (HCC): Status: ACTIVE | Noted: 2019-07-09

## 2019-07-09 PROBLEM — I50.33 ACUTE ON CHRONIC DIASTOLIC CHF (CONGESTIVE HEART FAILURE) (HCC): Status: ACTIVE | Noted: 2019-07-09

## 2019-07-09 PROBLEM — N18.9 ACUTE ON CHRONIC RENAL FAILURE (HCC): Status: ACTIVE | Noted: 2019-07-09

## 2019-07-09 PROBLEM — J90 PLEURAL EFFUSION, BILATERAL: Status: ACTIVE | Noted: 2019-07-09

## 2019-07-09 PROBLEM — I27.81 CHRONIC COR PULMONALE (HCC): Status: ACTIVE | Noted: 2019-07-09

## 2019-07-09 PROBLEM — I10 ESSENTIAL HYPERTENSION: Status: ACTIVE | Noted: 2019-07-09

## 2019-07-09 LAB
ANION GAP SERPL CALCULATED.3IONS-SCNC: 8.5 MMOL/L (ref 5–15)
BUN BLD-MCNC: 40 MG/DL (ref 8–23)
BUN/CREAT SERPL: 19.5 (ref 7–25)
CALCIUM SPEC-SCNC: 9.3 MG/DL (ref 8.2–9.6)
CHLORIDE SERPL-SCNC: 104 MMOL/L (ref 98–107)
CO2 SERPL-SCNC: 29.5 MMOL/L (ref 22–29)
CREAT BLD-MCNC: 2.05 MG/DL (ref 0.57–1)
GFR SERPL CREATININE-BSD FRML MDRD: 23 ML/MIN/1.73
GLUCOSE BLD-MCNC: 78 MG/DL (ref 65–99)
POTASSIUM BLD-SCNC: 4.2 MMOL/L (ref 3.5–5.2)
SODIUM BLD-SCNC: 142 MMOL/L (ref 136–145)

## 2019-07-09 PROCEDURE — 99232 SBSQ HOSP IP/OBS MODERATE 35: CPT | Performed by: HOSPITALIST

## 2019-07-09 PROCEDURE — 80048 BASIC METABOLIC PNL TOTAL CA: CPT | Performed by: HOSPITALIST

## 2019-07-09 PROCEDURE — 99222 1ST HOSP IP/OBS MODERATE 55: CPT | Performed by: INTERNAL MEDICINE

## 2019-07-09 PROCEDURE — 94799 UNLISTED PULMONARY SVC/PX: CPT

## 2019-07-09 RX ADMIN — CARVEDILOL 3.12 MG: 6.25 TABLET, FILM COATED ORAL at 17:52

## 2019-07-09 RX ADMIN — OXYCODONE HYDROCHLORIDE AND ACETAMINOPHEN 1 TABLET: 5; 325 TABLET ORAL at 10:52

## 2019-07-09 RX ADMIN — MEMANTINE HYDROCHLORIDE 10 MG: 5 TABLET ORAL at 09:17

## 2019-07-09 RX ADMIN — DONEPEZIL HYDROCHLORIDE 10 MG: 5 TABLET, FILM COATED ORAL at 21:44

## 2019-07-09 RX ADMIN — MEMANTINE HYDROCHLORIDE 10 MG: 5 TABLET ORAL at 21:44

## 2019-07-09 RX ADMIN — DOCUSATE SODIUM 200 MG: 100 CAPSULE, LIQUID FILLED ORAL at 09:17

## 2019-07-09 RX ADMIN — FAMOTIDINE 20 MG: 20 TABLET, FILM COATED ORAL at 09:22

## 2019-07-09 RX ADMIN — CARVEDILOL 3.12 MG: 6.25 TABLET, FILM COATED ORAL at 09:15

## 2019-07-09 RX ADMIN — SODIUM BICARBONATE 650 MG TABLET 650 MG: at 09:17

## 2019-07-09 RX ADMIN — LEVOTHYROXINE SODIUM 88 MCG: 0.09 TABLET ORAL at 09:16

## 2019-07-09 RX ADMIN — IPRATROPIUM BROMIDE 0.5 MG: 0.5 SOLUTION RESPIRATORY (INHALATION) at 10:42

## 2019-07-09 RX ADMIN — OXYCODONE HYDROCHLORIDE AND ACETAMINOPHEN 1 TABLET: 5; 325 TABLET ORAL at 17:45

## 2019-07-09 RX ADMIN — MELATONIN TAB 5 MG 5 MG: 5 TAB at 21:44

## 2019-07-09 RX ADMIN — APIXABAN 2.5 MG: 2.5 TABLET, FILM COATED ORAL at 09:15

## 2019-07-09 RX ADMIN — LORAZEPAM 0.5 MG: 0.5 TABLET ORAL at 09:31

## 2019-07-09 RX ADMIN — MIRTAZAPINE 15 MG: 15 TABLET, FILM COATED ORAL at 21:44

## 2019-07-09 RX ADMIN — LORAZEPAM 0.5 MG: 0.5 TABLET ORAL at 21:44

## 2019-07-09 RX ADMIN — LISINOPRIL 5 MG: 5 TABLET ORAL at 09:16

## 2019-07-09 RX ADMIN — SERTRALINE HYDROCHLORIDE 125 MG: 50 TABLET ORAL at 09:16

## 2019-07-09 NOTE — NURSING NOTE
Continued Stay Note  BETY Coleman     Patient Name: Amparo Vergara  MRN: [id number]  Today's Date: 7/9/2019    Admit Date: 7/7/2019    Discharge Plan     Row Name 07/09/19 1708       Plan    Plan  plan return to [facility]    Patient/Family in Agreement with Plan  yes    Plan Comments  Spoke with patient and visitor at bedside, no needs noted. Patient states she is feeling better- plan remains to return to [facility] when able. CM will continue to follow.        Discharge Codes    No documentation.             Shady Mondragon RN

## 2019-07-09 NOTE — PROGRESS NOTES
"Hospitalist Team      Patient Care Team:  Nikita Castellon MD as PCP - General (Family Medicine)  Provider, No Known as PCP - Family Medicine        Chief Complaint:  Acute on chronic hypoxic respiratory failure and congestive heart failure    Subjective    Interval History and ROS:     The patient continues to complain of shortness of breath intermittently.  He does have some mild increased work of breathing on examination today.  She remains on 4 L nasal cannula.  The patient's blood pressure remains within normal limits.  Heart rate is also mostly within normal limits.  Patient denies any chest pain, nausea, vomiting.  She states her appetite is \"too good\".  She is afebrile.      Objective    Vital Signs  Temp:  [96.8 °F (36 °C)-98.5 °F (36.9 °C)] 98.2 °F (36.8 °C)  Heart Rate:  [59-79] 59  Resp:  [16-20] 18  BP: ()/(47-81) 96/47  Oxygen Therapy  SpO2: 95 %  Pulse Oximetry Type: Intermittent  Device (Oxygen Therapy): nasal cannula  Flow (L/min): 4     Flowsheet Rows      First Filed Value   Admission Height  157.5 cm (62\") Documented at 07/07/2019 0151   Admission Weight  78.5 kg (173 lb) Documented at 07/07/2019 0151            Physical Exam:  Physical Exam   Constitutional: She appears well-developed. No distress.   Elderly female   HENT:   Head: Normocephalic and atraumatic.   Mouth/Throat: Oropharynx is clear and moist.   Eyes: Conjunctivae and EOM are normal. No scleral icterus.   Neck: Neck supple. No JVD present.   Cardiovascular: Normal rate. Exam reveals no friction rub.   No murmur heard.  Irregularly irregular rhythm   Pulmonary/Chest: She has no wheezes. She has no rales.   Diminished breath sounds at the bilateral bases right greater than the left.  Slight increased work of breathing especially when the patient talks.   Abdominal: Soft. Bowel sounds are normal. There is no tenderness.   Musculoskeletal: She exhibits no edema.   Neurological: She is alert.   Oriented x2   Skin: Skin is warm and " dry.  Chronic stasis changes of the bilateral lower extremities.       Results Review:     I reviewed the patient's new clinical results.    Lab Results (last 24 hours)     Procedure Component Value Units Date/Time    Basic Metabolic Panel [[id number]]  (Abnormal) Collected:  07/09/19 0642    Specimen:  Blood Updated:  07/09/19 0706     Glucose 78 mg/dL      BUN 40 mg/dL      Creatinine 2.05 mg/dL      Sodium 142 mmol/L      Potassium 4.2 mmol/L      Chloride 104 mmol/L      CO2 29.5 mmol/L      Calcium 9.3 mg/dL      eGFR Non African Amer 23 mL/min/1.73      BUN/Creatinine Ratio 19.5     Anion Gap 8.5 mmol/L     Narrative:       GFR Normal >60  Chronic Kidney Disease <60  Kidney Failure <15          Imaging Results (last 24 hours)     ** No results found for the last 24 hours. **        ECG/EMG Results (most recent)     Procedure Component Value Units Date/Time    Adult Transthoracic Echo Complete W/ Cont if Necessary Per Protocol [[id number]] Collected:  07/08/19 0838     Updated:  07/08/19 1543     BSA 1.8 m^2      IVSd 1.2 cm      LVIDd 4.3 cm      LVIDs 3.1 cm      LVPWd 1.1 cm      IVS/LVPW 1.1     FS 29.7 %      EDV(Teich) 84.9 ml      ESV(Teich) 36.4 ml      EF(Teich) 57.1 %      EDV(cubed) 81.7 ml      ESV(cubed) 28.4 ml      EF(cubed) 65.3 %      LV mass(C)d 181.7 grams      LV mass(C)dI 103.7 grams/m^2      SV(Teich) 48.5 ml      SI(Teich) 27.7 ml/m^2      SV(cubed) 53.4 ml      SI(cubed) 30.5 ml/m^2      Ao root diam 3.6 cm      Ao root area 10.2 cm^2      LA dimension 4.3 cm      LA/Ao 1.2     LVOT diam 1.9 cm      LVOT area 2.8 cm^2      LVOT area(traced) 2.8 cm^2      RVOT diam 2.2 cm      RVOT area 3.8 cm^2      LVLd ap4 6.6 cm      EDV(MOD-sp4) 84.5 ml      LVLs ap4 6.3 cm      ESV(MOD-sp4) 31.9 ml      EF(MOD-sp4) 62.2 %      LVLd ap2 6.3 cm      EDV(MOD-sp2) 57.9 ml      LVLs ap2 4.9 cm      ESV(MOD-sp2) 24.7 ml      EF(MOD-sp2) 57.3 %      SV(MOD-sp4) 52.6 ml      SI(MOD-sp4) 30.0 ml/m^2       SV(MOD-sp2) 33.2 ml      SI(MOD-sp2) 18.9 ml/m^2      Ao root area (BSA corrected) 2.1     LV Portillo Vol (BSA corrected) 48.2 ml/m^2      LV Sys Vol (BSA corrected) 18.2 ml/m^2      MV E max arben 113.0 cm/sec      MV V2 max 134.0 cm/sec      MV max PG 7.2 mmHg      MV V2 mean 64.0 cm/sec      MV mean PG 2.0 mmHg      MV V2 VTI 26.3 cm      MVA(VTI) 1.6 cm^2      MV P1/2t max arben 144.0 cm/sec      MV P1/2t 57.0 msec      MVA(P1/2t) 3.9 cm^2      MV dec slope 740.0 cm/sec^2      MV dec time 0.2 sec      Ao pk arben 112.0 cm/sec      Ao max PG 5.0 mmHg      Ao max PG (full) 2.7 mmHg      Ao V2 mean 79.0 cm/sec      Ao mean PG 3.0 mmHg      Ao mean PG (full) 2.0 mmHg      Ao V2 VTI 21.7 cm      PETER(I,A) 1.9 cm^2      PETER(I,D) 1.9 cm^2      PETER(V,A) 1.9 cm^2      PETER(V,D) 1.9 cm^2      LV V1 max PG 2.3 mmHg      LV V1 mean PG 1.0 mmHg      LV V1 max 76.2 cm/sec      LV V1 mean 45.3 cm/sec      LV V1 VTI 14.6 cm      SV(Ao) 220.9 ml      SI(Ao) 126.0 ml/m^2      SV(LVOT) 41.4 ml      SV(RVOT) 48.7 ml      SI(LVOT) 23.6 ml/m^2      PA V2 max 81.9 cm/sec      PA max PG 2.7 mmHg      PA max PG (full) 1.3 mmHg      BH CV ECHO SYED - PVA(V,A) 2.7 cm^2      BH CV ECHO SYED - PVA(V,D) 2.7 cm^2      PA acc time 0.07 sec      RV V1 max PG 1.4 mmHg      RV V1 mean PG 1.0 mmHg      RV V1 max 59.1 cm/sec      RV V1 mean 40.9 cm/sec      RV V1 VTI 12.8 cm      TR max arben 284 cm/sec      PA pr(Accel) 45.7 mmHg      Pulm Sys Arben 24.8 cm/sec      Pulm Portillo Arben 23.5 cm/sec      Pulm S/D 1.1     Qp/Qs 1.2     Pulm A Revs Dur 0.16 sec      Pulm A Revs Arben 19.4 cm/sec      MVA P1/2T LCG 1.5 cm^2      BH CV ECHO SYED - BZI_BMI 29.8 kilograms/m^2      BH CV ECHO SYED - BSA([place]) 1.8 m^2      BH CV ECHO SYED - BZI_METRIC_WEIGHT 73.9 kg      BH CV ECHO SYED - BZI_METRIC_HEIGHT 157.5 cm      Target HR (85%) 108 bpm      Max. Pred. HR (100%) 127 bpm      BH CV VAS BP RIGHT /65 mmHg      LA Volume Index 71.0 mL/m2      EF(MOD-bp) 57.0 %       RAP systole 8 mmHg      RVSP(TR) 32 mmHg     Narrative:       · There is limited visualization of all cardiac structures The study is   technically difficult for diagnosis.  · Left ventricular systolic function is normal. Calculated EF = 57.0%.   Estimated EF was in agreement with the calculated EF. Normal left   ventricular cavity size noted. Left ventricular diastolic function was   indeterminate.       ECG 12 Lead [[id number]] Collected:  07/07/19 0201     Updated:  07/09/19 1235    Narrative:       HEART RATE= 65  bpm  RR Interval= 925  ms  [place] Interval=   ms  P Horizontal Axis=   deg  P Front Axis=   deg  QRSD Interval= 96  ms  QT Interval= 428  ms  QRS Axis= 0  deg  T Wave Axis= 147  deg  - ABNORMAL ECG -  Atrial fibrillation  Nonspecific T abnrm, anterolateral leads , new c/w prior  Electronically Signed By: Lokesh Trinidad ([facility]) 09-Jul-2019 12:34:53  Date and Time of Study: 2019-07-07 02:01:41          Medication Review:   I have reviewed the patient's current medication list    Current Facility-Administered Medications:   •  acetaminophen (TYLENOL) tablet 1,000 mg, 1,000 mg, Oral, Q6H PRN, Ashu Rivera MD  •  bisacodyl (DULCOLAX) suppository 10 mg, 10 mg, Rectal, Daily PRN, Ashu Rivera MD  •  carvedilol (COREG) tablet 3.125 mg, 3.125 mg, Oral, BID With Meals, Lulú Lorenzo MD, 3.125 mg at 07/09/19 0915  •  docusate sodium (COLACE) capsule 200 mg, 200 mg, Oral, Daily, Ashu Rivera MD, 200 mg at 07/09/19 0917  •  donepezil (ARICEPT) tablet 10 mg, 10 mg, Oral, Nightly, Ashu Rivera MD, 10 mg at 07/08/19 2013  •  famotidine (PEPCID) tablet 20 mg, 20 mg, Oral, Daily, Ashu Rivera MD, 20 mg at 07/09/19 0922  •  ipratropium (ATROVENT) nebulizer solution 0.5 mg, 500 mcg, Nebulization, 4x Daily PRN, Ashu Rivera MD, 0.5 mg at 07/09/19 1042  •  levothyroxine (SYNTHROID, LEVOTHROID) tablet 88 mcg, 88 mcg, Oral, Daily, Ashu Rivera MD, 88 mcg at 07/09/19  0916  •  LORazepam (ATIVAN) tablet 0.5 mg, 0.5 mg, Oral, BID, Ashu Rivera MD, 0.5 mg at 07/09/19 0931  •  melatonin tablet 5 mg, 5 mg, Oral, Nightly, Ashu Rivera MD, 5 mg at 07/08/19 2014  •  memantine (NAMENDA) tablet 10 mg, 10 mg, Oral, Q12H, Ashu Rivera MD, 10 mg at 07/09/19 0917  •  mirtazapine (REMERON) tablet 15 mg, 15 mg, Oral, Nightly, Ashu Rivera MD, 15 mg at 07/08/19 2016  •  oxyCODONE-acetaminophen (PERCOCET) 5-325 MG per tablet 1 tablet, 1 tablet, Oral, Q4H PRN, Ashu Rivera MD, 1 tablet at 07/09/19 1052  •  sertraline (ZOLOFT) tablet 125 mg, 125 mg, Oral, Daily, Ashu Rivera MD, 125 mg at 07/09/19 0916  •  sodium bicarbonate tablet 650 mg, 650 mg, Oral, Daily, Ashu Rivera MD, 650 mg at 07/09/19 0917  •  [COMPLETED] Insert peripheral IV, , , Once **AND** sodium chloride 0.9 % flush 10 mL, 10 mL, Intravenous, PRN, London Bai MD      Assessment/Plan     -Acute on chronic hypoxic respiratory failure: Secondary to acute congestive heart failure with bilateral effusions.  Titrate down oxygen as tolerated.  2 L nasal cannula at baseline.     -Acute on chronic diastolic congestive heart failure: Cardiology is now following.    The patient's renal function did not tolerate diuresis.  Her Bumex has been stopped.  Monitor daily weights and I's and O's.  Echo today with normal ejection fraction.  CT of the chest shows moderate sized bilateral pleural effusions with associated atelectasis.    Continue incentive spirometry.  Procalcitonin here was negative, no signs of acute infectious process currently.  Cardiology suggests thoracentesis on the right the patient's Eliquis has been held.  I spoke with the radiologist and ideally we would wait 48 hours from her last dose of Eliquis to perform the thoracentesis, which would be Thursday morning.  He could do it sooner the need became urgent.  For now I will order the study to be done Thursday morning.  New T wave  inversions were noted on EKG but the patient has no chest pain and troponin here is negative.  Cardiology does not recommend further ischemic evaluation.    -Acute kidney injury on chronic kidney disease stage III: Baseline creatinine appears to be approximately 1.4. Patient on her home dose of oral bicarbonate.  The patient's creatinine bumped today likely secondary to the IV Bumex she received yesterday evening.  Her renal function is not tolerating diuresis.  All diuretics and the patient's lisinopril have been stopped.  No IV fluids for now as the patient is in acute heart failure.  Will monitor closely.    -Chronic atrial fibrillation: The patient is anticoagulated with Eliquis.    Eliquis is on hold as noted above.  Her rate is within normal limits on a low-dose of Coreg.     -Hypertension: Blood pressure is mostly within normal limits, there are a few readings in the low/normal range.  Patient was on her home dose of lisinopril and her prior home dose of Coreg was resumed here.    Her lisinopril was stopped this morning secondary to her acute kidney injury.  Continue to monitor.     -Hypothyroidism: TSH is within normal limits, home supplementation continued.     -Dementia: Mental status appears at baseline here.  Patient continued on her home dose of Aricept, Namenda, and Remeron.       -Anxiety and depression: Patient on her home dose of Ativan, Remeron, and Zoloft.  No acute issues here.     -Dyslipidemia: On no medications for this currently.  In this elderly female likely risks outweigh benefits.     -COPD: We will have nursing check with the nursing home to see if the patient had breathing treatments there.  No wheezes on exam here.    Plan for disposition: Back to [facility] when able    Lulú Lorenzo MD  07/09/19  5:01 PM

## 2019-07-09 NOTE — CONSULTS
Date of Hospital Visit: 19  Encounter Provider: Lokesh Trinidad MD  Place of Service: [facility] CARDIOLOGY  Patient Name: Amparo Vergara  :3/26/1926  Referral Provider: Ashu Rivera MD    Chief complaint: SOA    History of Present Illness    Ms Vergara is a 89yo woman who resides in a long-term care facility.  She has chronic hypoxia and is usually on 2L oxygen.  She has dementia but is pleasant. She awakens easily and answers my questions but has limited memory of recent events.    Currently she is lying flat, on 4L oxygen, breathing comfortably.  She denies chest pain.    She was noted to have respiratory distress at her nursing home, with a reported POx in the 50% range.  Upon arrival to the ED, her SBP was 145 mm Hg, and her pulse was normal.  Her POx was 93% there but I don't know how much oxygen she was on.    Since the evening of , she has received one dose of bumetanide IV (1mg), and five doses of bumetanide po (2mg).      Past Medical History:   Diagnosis Date   • Anemia    • Anxiety    • CAD (coronary artery disease)    • Chronic atrial fibrillation (CMS/HCC)    • Chronic cor pulmonale (CMS/HCC)    • Chronic diastolic (congestive) heart failure (CMS/HCC)    • Chronic kidney disease    • COPD (chronic obstructive pulmonary disease) (CMS/HCC)    • Depression    • Encephalopathy, toxic    • GERD (gastroesophageal reflux disease)    • Herpes simplex    • Hyperlipidemia    • Hypertension    • Hypothyroidism    • Iron deficiency    • Osteoarthritis 10/30/2014    Overview:  2015 IMO UPDATE   • Osteoporosis    • Rhabdomyolysis    • Stroke (CMS/HCC)    • Venous stasis        Past Surgical History:   Procedure Laterality Date   • ABDOMINAL SURGERY     • CARPAL TUNNEL RELEASE     • HYSTERECTOMY     • THYROID SURGERY         Prior to Admission medications    Medication Sig Start Date End Date Taking? Authorizing Provider   acetaminophen (TYLENOL) 500 MG tablet Take  1,000 mg by mouth Every 6 (Six) Hours As Needed for Mild Pain .   Yes Nik Cates MD   apixaban (ELIQUIS) 2.5 MG tablet tablet Take 2.5 mg by mouth 2 (Two) Times a Day.   Yes Nik Cates MD   bisacodyl (DULCOLAX) 10 MG suppository Insert 10 mg into the rectum Daily As Needed for Constipation.   Yes Nik Cates MD   bumetanide (BUMEX) 1 MG tablet Take 2 mg by mouth Daily.   Yes Nik Cates MD   bumetanide (BUMEX) 1 MG tablet Take 1 mg by mouth Every Night.   Yes Nik Cates MD   calcium carbonate (TUMS) 500 MG chewable tablet Chew 1 tablet Daily.   Yes Nik Cates MD   carvedilol (COREG) 3.125 MG tablet Take 6.25 mg by mouth 2 (Two) Times a Day. 10/15/14  Yes Nik Cates MD   docusate sodium (COLACE) 250 MG capsule Take 250 mg by mouth Daily.   Yes Nik Cates MD   donepezil (ARICEPT) 10 MG tablet Take 10 mg by mouth Every Night.   Yes Nik Cates MD   ipratropium (ATROVENT) 0.02 % nebulizer solution Take 500 mcg by nebulization 4 (Four) Times a Day As Needed.   Yes Nik Cates MD   lactulose (CHRONULAC) 10 GM/15ML solution Take 20 g by mouth Daily As Needed (constipation).   Yes Nik Cates MD   levothyroxine (SYNTHROID, LEVOTHROID) 88 MCG tablet Take 88 mcg by mouth Daily.   Yes Nik Cates MD   lisinopril (PRINIVIL,ZESTRIL) 5 MG tablet Take 5 mg by mouth Daily.   Yes Nik Cates MD   LORazepam (ATIVAN) 0.5 MG tablet Take 0.5 mg by mouth 2 (Two) Times a Day.   Yes Nik Cates MD   LORazepam (ATIVAN) 0.5 MG tablet Take 0.5 mg by mouth Daily As Needed for Anxiety.   Yes Nik Cates MD   magnesium oxide (MAGOX) 400 (241.3 Mg) MG tablet tablet Take 400 mg by mouth Daily.   Yes Nik Cates MD   melatonin 5 MG tablet tablet Take 6 mg by mouth Every Night.   Yes Nik Cates MD   memantine (NAMENDA) 10 MG tablet Take 10 mg by mouth 2 (Two) Times a Day.    Yes Nik Cates MD   mirtazapine (REMERON) 15 MG tablet Take 15 mg by mouth Every Night.   Yes Nik Cates MD   Multiple Vitamins-Minerals (MULTIVITAMIN WITH MINERALS) tablet tablet Take 1 tablet by mouth Daily.   Yes Nik Cates MD   oxyCODONE-acetaminophen (PERCOCET) 5-325 MG per tablet Take 1 tablet by mouth Every 4 (Four) Hours As Needed for Moderate Pain  or Severe Pain .   Yes Nik Cates MD   potassium chloride (K-DUR,KLOR-CON) 20 MEQ CR tablet Take 20 mEq by mouth 2 (Two) Times a Day.   Yes Nik Cates MD   ranitidine (ZANTAC) 150 MG capsule Take 150 mg by mouth 2 (Two) Times a Day.   Yes Nik Cates MD   sertraline (ZOLOFT) 100 MG tablet Take 125 mg by mouth Daily.   Yes Nik Cates MD   sodium bicarbonate 650 MG tablet Take 650 mg by mouth Daily.   Yes Nik Cates MD   vitamin B-12 (CYANOCOBALAMIN) 1000 MCG tablet Take 1,000 mcg by mouth Daily.   Yes Nik Cates MD   zolpidem (AMBIEN) 10 MG tablet Take 5 mg by mouth Every Night.   Yes Nki Cates MD       Social History     Socioeconomic History   • Marital status:      Spouse name: Not on file   • Number of children: Not on file   • Years of education: Not on file   • Highest education level: Not on file   Tobacco Use   • Smoking status: Former Smoker     Packs/day: 0.50     Years: 40.00     Pack years: 20.00     Types: Cigarettes     Last attempt to quit: 2007     Years since quittin.6   Substance and Sexual Activity   • Alcohol use: No     Frequency: Never   • Drug use: No   • Sexual activity: Not Currently       Family History   Family history unknown: Yes       Review of Systems   Unable to perform ROS: Dementia        Objective:     Vitals:    19 1916 19 2350 19 0631 19 0900   BP: 103/66 119/67 93/61 120/60   BP Location: Left arm Left arm Left arm    Patient Position: Lying Lying Lying    Pulse: 74 67 68   "  Resp: 18 16 16    Temp: 98.5 °F (36.9 °C) 98.2 °F (36.8 °C) 96.8 °F (36 °C)    TempSrc: Oral Oral Oral    SpO2: 96% 96% 93%    Weight:       Height:         Body mass index is 29.81 kg/m².  Flowsheet Rows      First Filed Value   Admission Height  157.5 cm (62\") Documented at 07/07/2019 0151   Admission Weight  78.5 kg (173 lb) Documented at 07/07/2019 0151          Physical Exam   HENT:   Head: Normocephalic.   Nose: Nose normal.   Mouth/Throat: Oropharynx is clear and moist.   Eyes: Conjunctivae and EOM are normal. Pupils are equal, round, and reactive to light.   Neck: Normal range of motion.   Cardiovascular: Normal rate and normal heart sounds. An irregularly irregular rhythm present.   Pulmonary/Chest: Effort normal. She has decreased breath sounds in the right middle field, the right lower field and the left lower field.   Abdominal: Soft. There is no tenderness.   Musculoskeletal: She exhibits edema (stasis changes, mild edema).   Neurological: She is alert.   Skin: Skin is warm and dry. No erythema.   Psychiatric: She has a normal mood and affect.   Vitals reviewed.              Lab Review:                Results from last 7 days   Lab Units 07/09/19  0642   SODIUM mmol/L 142   POTASSIUM mmol/L 4.2   CHLORIDE mmol/L 104   CO2 mmol/L 29.5*   BUN mg/dL 40*   CREATININE mg/dL 2.05*   GLUCOSE mg/dL 78   CALCIUM mg/dL 9.3     Results from last 7 days   Lab Units 07/07/19  0836 07/07/19  0205   TROPONIN T ng/mL <0.010 <0.010     Results from last 7 days   Lab Units 07/08/19  0816   WBC 10*3/mm3 3.84   HEMOGLOBIN g/dL 9.2*   HEMATOCRIT % 30.3*   PLATELETS 10*3/mm3 123*     Results from last 7 days   Lab Units 07/07/19  0205   INR  1.21*   APTT seconds 50.6*         Results from last 7 days   Lab Units 07/08/19  0730   MAGNESIUM mg/dL 2.0             I personally viewed and interpreted the patient's EKG/Telemetry data            Echo 7/8/2019:    · There is limited visualization of all cardiac structures The " study is technically difficult for diagnosis.  · Left ventricular systolic function is normal. Calculated EF = 57.0%. Estimated EF was in agreement with the calculated EF. Normal left ventricular cavity size noted. Left ventricular diastolic function was indeterminate.    Assessment/Plan:     1.  Acute on chronic respiratory failure with hypoxemia (CMS/HCC)  2.  Acute on chronic dCHF  3.  Chronic cor pulmonale  4.  Bilateral pleural effusions, R>L  5.  EKG changes  6.  Chronic atrial fibrillation, rate controlled  7.  LOLLY on CKD  8.  COPD  9.  Chronic venous insufficiency  10. HTN  11. Dementia    With diuresis, her hypoxia has not changed but her renal function has worsened, and she still has significant pleural effusions by my exam. I recommend thoracentesis on the right side and possibly on the left if she's still not improved.  I will hold apixaban and defer to primary team to order the procedure.  We can resume apixaban once done.  I am stopping oral diuretics at this point due to worsening LOLLY.    She does have new TWI on EKG, but has no angina.  The echo was profoundly difficult, with very limited visualization.  Given her advanced age, renal dysfunction, dementia, I would not recommend any further ischemic evaluation.  Her troponin is suprisingly normal.    Stop lisinopril.    Will follow.

## 2019-07-10 LAB
ANION GAP SERPL CALCULATED.3IONS-SCNC: 8.9 MMOL/L (ref 5–15)
BASOPHILS # BLD AUTO: 0.01 10*3/MM3 (ref 0–0.2)
BASOPHILS NFR BLD AUTO: 0.3 % (ref 0–1.5)
BUN BLD-MCNC: 39 MG/DL (ref 8–23)
BUN/CREAT SERPL: 20.1 (ref 7–25)
CALCIUM SPEC-SCNC: 9.2 MG/DL (ref 8.2–9.6)
CHLORIDE SERPL-SCNC: 105 MMOL/L (ref 98–107)
CO2 SERPL-SCNC: 29.1 MMOL/L (ref 22–29)
CREAT BLD-MCNC: 1.94 MG/DL (ref 0.57–1)
DEPRECATED RDW RBC AUTO: 58.6 FL (ref 37–54)
EOSINOPHIL # BLD AUTO: 0.17 10*3/MM3 (ref 0–0.4)
EOSINOPHIL NFR BLD AUTO: 4.3 % (ref 0.3–6.2)
ERYTHROCYTE [DISTWIDTH] IN BLOOD BY AUTOMATED COUNT: 15.6 % (ref 12.3–15.4)
GFR SERPL CREATININE-BSD FRML MDRD: 24 ML/MIN/1.73
GLUCOSE BLD-MCNC: 78 MG/DL (ref 65–99)
HCT VFR BLD AUTO: 30.1 % (ref 34–46.6)
HGB BLD-MCNC: 9.1 G/DL (ref 12–15.9)
IMM GRANULOCYTES # BLD AUTO: 0.01 10*3/MM3 (ref 0–0.05)
IMM GRANULOCYTES NFR BLD AUTO: 0.3 % (ref 0–0.5)
INR PPP: 1.17 (ref 0.9–1.1)
LYMPHOCYTES # BLD AUTO: 0.99 10*3/MM3 (ref 0.7–3.1)
LYMPHOCYTES NFR BLD AUTO: 24.8 % (ref 19.6–45.3)
MCH RBC QN AUTO: 31.5 PG (ref 26.6–33)
MCHC RBC AUTO-ENTMCNC: 30.2 G/DL (ref 31.5–35.7)
MCV RBC AUTO: 104.2 FL (ref 79–97)
MONOCYTES # BLD AUTO: 0.75 10*3/MM3 (ref 0.1–0.9)
MONOCYTES NFR BLD AUTO: 18.8 % (ref 5–12)
NEUTROPHILS # BLD AUTO: 2.07 10*3/MM3 (ref 1.7–7)
NEUTROPHILS NFR BLD AUTO: 51.5 % (ref 42.7–76)
NRBC BLD AUTO-RTO: 0 /100 WBC (ref 0–0.2)
PLATELET # BLD AUTO: 136 10*3/MM3 (ref 140–450)
PMV BLD AUTO: 12.4 FL (ref 6–12)
POTASSIUM BLD-SCNC: 4 MMOL/L (ref 3.5–5.2)
PROTHROMBIN TIME: 14.6 SECONDS (ref 12.1–15)
RBC # BLD AUTO: 2.89 10*6/MM3 (ref 3.77–5.28)
SODIUM BLD-SCNC: 143 MMOL/L (ref 136–145)
WBC NRBC COR # BLD: 4 10*3/MM3 (ref 3.4–10.8)

## 2019-07-10 PROCEDURE — 99232 SBSQ HOSP IP/OBS MODERATE 35: CPT | Performed by: NURSE PRACTITIONER

## 2019-07-10 PROCEDURE — 99232 SBSQ HOSP IP/OBS MODERATE 35: CPT | Performed by: HOSPITALIST

## 2019-07-10 PROCEDURE — 80048 BASIC METABOLIC PNL TOTAL CA: CPT | Performed by: INTERNAL MEDICINE

## 2019-07-10 PROCEDURE — 85025 COMPLETE CBC W/AUTO DIFF WBC: CPT | Performed by: HOSPITALIST

## 2019-07-10 PROCEDURE — 94799 UNLISTED PULMONARY SVC/PX: CPT

## 2019-07-10 PROCEDURE — 85610 PROTHROMBIN TIME: CPT | Performed by: HOSPITALIST

## 2019-07-10 RX ADMIN — SODIUM BICARBONATE 650 MG TABLET 650 MG: at 08:49

## 2019-07-10 RX ADMIN — DONEPEZIL HYDROCHLORIDE 10 MG: 5 TABLET, FILM COATED ORAL at 20:34

## 2019-07-10 RX ADMIN — IPRATROPIUM BROMIDE 0.5 MG: 0.5 SOLUTION RESPIRATORY (INHALATION) at 05:53

## 2019-07-10 RX ADMIN — LORAZEPAM 0.5 MG: 0.5 TABLET ORAL at 20:34

## 2019-07-10 RX ADMIN — LEVOTHYROXINE SODIUM 88 MCG: 0.09 TABLET ORAL at 08:51

## 2019-07-10 RX ADMIN — IPRATROPIUM BROMIDE 0.5 MG: 0.5 SOLUTION RESPIRATORY (INHALATION) at 21:06

## 2019-07-10 RX ADMIN — MEMANTINE HYDROCHLORIDE 10 MG: 5 TABLET ORAL at 20:34

## 2019-07-10 RX ADMIN — MEMANTINE HYDROCHLORIDE 10 MG: 5 TABLET ORAL at 08:50

## 2019-07-10 RX ADMIN — MIRTAZAPINE 15 MG: 15 TABLET, FILM COATED ORAL at 20:34

## 2019-07-10 RX ADMIN — SERTRALINE HYDROCHLORIDE 125 MG: 50 TABLET ORAL at 08:50

## 2019-07-10 RX ADMIN — CARVEDILOL 3.12 MG: 6.25 TABLET, FILM COATED ORAL at 18:46

## 2019-07-10 RX ADMIN — IPRATROPIUM BROMIDE 0.5 MG: 0.5 SOLUTION RESPIRATORY (INHALATION) at 16:47

## 2019-07-10 RX ADMIN — FAMOTIDINE 20 MG: 20 TABLET, FILM COATED ORAL at 08:51

## 2019-07-10 RX ADMIN — DOCUSATE SODIUM 200 MG: 100 CAPSULE, LIQUID FILLED ORAL at 08:50

## 2019-07-10 RX ADMIN — MELATONIN TAB 5 MG 5 MG: 5 TAB at 20:35

## 2019-07-10 RX ADMIN — CARVEDILOL 3.12 MG: 6.25 TABLET, FILM COATED ORAL at 08:50

## 2019-07-10 RX ADMIN — LORAZEPAM 0.5 MG: 0.5 TABLET ORAL at 08:51

## 2019-07-10 NOTE — PROGRESS NOTES
"    Patient Name: Amparo Vergara  :3/26/1926  89 y.o.      Patient Care Team:  Nikita Castellon MD as PCP - General (Family Medicine)  Provider, No Known as PCP - Family Medicine    Chief Complaint: Acute/chronic hypoxic respiratory failure and congestive heart failiure    Interval History: Denies chest pain, she states she feels her \"breathing is ok, I just woke up\". 02 4L NC       Objective   Vital Signs  Temp:  [97.6 °F (36.4 °C)-98.2 °F (36.8 °C)] 97.8 °F (36.6 °C)  Heart Rate:  [59-71] 69  Resp:  [18-20] 20  BP: ()/(47-71) 107/68    Intake/Output Summary (Last 24 hours) at 7/10/2019 1109  Last data filed at 7/10/2019 0900  Gross per 24 hour   Intake 460 ml   Output --   Net 460 ml     Flowsheet Rows      First Filed Value   Admission Height  157.5 cm (62\") Documented at 2019 0151   Admission Weight  78.5 kg (173 lb) Documented at 2019 0151          Physical Exam:   General Appearance:    Alert to self, frail, somewhat cooperative, in no acute distress   Lungs:     Diminished breath sounds in bilateral bases.  Normal respiratory effort and rate.      Heart:    Irregular rhythm and normal rate, normal S1 and S2, no murmurs, gallops or rubs.     Chest Wall:    No abnormalities observed   Abdomen:     Soft, nontender, positive bowel sounds.     Extremities:   no cyanosis, clubbing or edema.  No marked joint deformities.  Adequate musculoskeletal strength.       Results Review:    Results from last 7 days   Lab Units 07/10/19  0404   SODIUM mmol/L 143   POTASSIUM mmol/L 4.0   CHLORIDE mmol/L 105   CO2 mmol/L 29.1*   BUN mg/dL 39*   CREATININE mg/dL 1.94*   GLUCOSE mg/dL 78   CALCIUM mg/dL 9.2     Results from last 7 days   Lab Units 19  0836 19  0205   TROPONIN T ng/mL <0.010 <0.010     Results from last 7 days   Lab Units 07/10/19  0404   WBC 10*3/mm3 4.00   HEMOGLOBIN g/dL 9.1*   HEMATOCRIT % 30.1*   PLATELETS 10*3/mm3 136*     Results from last 7 days   Lab Units 07/10/19  0404 " 07/07/19  0205   INR  1.17* 1.21*   APTT seconds  --  50.6*     Results from last 7 days   Lab Units 07/08/19  0730   MAGNESIUM mg/dL 2.0                   Medication Review:     carvedilol 3.125 mg Oral BID With Meals   docusate sodium 200 mg Oral Daily   donepezil 10 mg Oral Nightly   famotidine 20 mg Oral Daily   levothyroxine 88 mcg Oral Daily   LORazepam 0.5 mg Oral BID   melatonin 5 mg Oral Nightly   memantine 10 mg Oral Q12H   mirtazapine 15 mg Oral Nightly   sertraline 125 mg Oral Daily   sodium bicarbonate 650 mg Oral Daily          hold 1 each       Assessment/Plan       1.  Acute on chronic respiratory failure with hypoxemia (CMS/HCC) - 4 L NC, no distress, appears comfortable, denies SOA    2.  Acute on chronic dCHF - echo 7/8/19 -   · There is limited visualization of all cardiac structures The study is technically difficult for diagnosis.  · Left ventricular systolic function is normal. Calculated EF = 57.0%. Estimated EF was in agreement with the calculated EF. Normal left ventricular cavity size noted. Left ventricular diastolic function was indeterminate.     3..  Chronic cor pulmonale    4.  Bilateral pleural effusions, R>L - scheduled for R thoracentesis on 7/11/19.  Apixaban on hold since 7/9/19. Recommend restarting after procedure tomorrow.    5.  EKG changes - new T wave inversion noted.  No angina.  Given advanced age, renal dysfunction and dementia, would not recommend further ischemic evaluation.    6.  Chronic atrial fibrillation, rate controlled. On apixaban but this is on hold for Right thoracentesis 7/11/19.    7.  LOLLY on CKD - oral diuretics stopped, creatinine mildly improved today at 1.94.    8.  COPD - chronic 02, 4L per NC  9.  Chronic venous insufficiency  10. HTN- stable  11. Dementia      RAFFY Vaughan  [facility]  07/10/19  11:09 AM

## 2019-07-10 NOTE — PROGRESS NOTES
"Hospitalist Team      Patient Care Team:  Nikita Castellon MD as PCP - General (Family Medicine)  Provider, No Known as PCP - Family Medicine        Chief Complaint:  Acute on chronic hypoxic respiratory failure and congestive heart failure with pleural effusions.    Subjective    Interval History and ROS:     The patient states that she is feeling okay today.  At the time of my examination she denies any shortness of breath.  She still appears to have some mild increased work of breathing as she is talking to me.  She states she is eating and drinking okay.  She denies any chest pain or heart palpitations.  She denies any nausea or vomiting.  She is afebrile and remains on 4 L nasal cannula.  Blood pressures are within normal limits.      Objective    Vital Signs  Temp:  [97.6 °F (36.4 °C)-98.2 °F (36.8 °C)] 98.2 °F (36.8 °C)  Heart Rate:  [58-71] 58  Resp:  [14-20] 16  BP: ()/(54-71) 102/63  Oxygen Therapy  SpO2: 97 %  Pulse Oximetry Type: Intermittent  Device (Oxygen Therapy): nasal cannula  Flow (L/min): 4     Flowsheet Rows      First Filed Value   Admission Height  157.5 cm (62\") Documented at 07/07/2019 0151   Admission Weight  78.5 kg (173 lb) Documented at 07/07/2019 0151            Physical Exam:  Physical Exam   Constitutional: She appears well-developed. No distress.   Elderly female   HENT:   Head: Normocephalic and atraumatic.   Mouth/Throat: Oropharynx is clear and moist.   Eyes: Conjunctivae and EOM are normal. No scleral icterus.   Neck: Neck supple. No JVD present.   Cardiovascular: Normal rate. Exam reveals no friction rub.   No murmur heard.  Irregularly irregular rhythm   Pulmonary/Chest: She has no wheezes.   Diminished breath sounds at the bilateral bases right greater than the left.  Faint Rales noted bilaterally lower lungs.  Slight increased work of breathing especially when she talks.   Abdominal: Soft. Bowel sounds are normal. There is no tenderness.   Musculoskeletal: She exhibits " no edema.   Neurological: She is alert.   Oriented x2   Skin: Skin is warm and dry.  Chronic stasis changes of the bilateral lower extremities.      Results Review:     I reviewed the patient's new clinical results.    Lab Results (last 24 hours)     Procedure Component Value Units Date/Time    Protime-INR [[id number]]  (Abnormal) Collected:  07/10/19 0404    Specimen:  Blood Updated:  07/10/19 0609     Protime 14.6 Seconds      INR 1.17    Narrative:       Therapeutic Ranges for INR: 2.0-3.0 (PT 20-30)                              2.5-3.5 (PT 25-34)    Basic Metabolic Panel [[id number]]  (Abnormal) Collected:  07/10/19 0404    Specimen:  Blood Updated:  07/10/19 0442     Glucose 78 mg/dL      BUN 39 mg/dL      Creatinine 1.94 mg/dL      Sodium 143 mmol/L      Potassium 4.0 mmol/L      Chloride 105 mmol/L      CO2 29.1 mmol/L      Calcium 9.2 mg/dL      eGFR Non African Amer 24 mL/min/1.73      BUN/Creatinine Ratio 20.1     Anion Gap 8.9 mmol/L     Narrative:       GFR Normal >60  Chronic Kidney Disease <60  Kidney Failure <15    CBC & Differential [[id number]] Collected:  07/10/19 0404    Specimen:  Blood Updated:  07/10/19 0425    Narrative:       The following orders were created for panel order CBC & Differential.  Procedure                               Abnormality         Status                     ---------                               -----------         ------                     CBC Auto Differential[[id number]]        Abnormal            Final result                 Please view results for these tests on the individual orders.    CBC Auto Differential [[id number]]  (Abnormal) Collected:  07/10/19 0404    Specimen:  Blood Updated:  07/10/19 0425     WBC 4.00 10*3/mm3      RBC 2.89 10*6/mm3      Hemoglobin 9.1 g/dL      Hematocrit 30.1 %      .2 fL      MCH 31.5 pg      MCHC 30.2 g/dL      RDW 15.6 %      RDW-SD 58.6 fl      MPV 12.4 fL      Platelets 136 10*3/mm3      Neutrophil % 51.5 %      Lymphocyte  % 24.8 %      Monocyte % 18.8 %      Eosinophil % 4.3 %      Basophil % 0.3 %      Immature Grans % 0.3 %      Neutrophils, Absolute 2.07 10*3/mm3      Lymphocytes, Absolute 0.99 10*3/mm3      Monocytes, Absolute 0.75 10*3/mm3      Eosinophils, Absolute 0.17 10*3/mm3      Basophils, Absolute 0.01 10*3/mm3      Immature Grans, Absolute 0.01 10*3/mm3      nRBC 0.0 /100 WBC           Imaging Results (last 24 hours)     ** No results found for the last 24 hours. **        ECG/EMG Results (most recent)     Procedure Component Value Units Date/Time    Adult Transthoracic Echo Complete W/ Cont if Necessary Per Protocol [[id number]] Collected:  07/08/19 0838     Updated:  07/08/19 1543     BSA 1.8 m^2      IVSd 1.2 cm      LVIDd 4.3 cm      LVIDs 3.1 cm      LVPWd 1.1 cm      IVS/LVPW 1.1     FS 29.7 %      EDV(Teich) 84.9 ml      ESV(Teich) 36.4 ml      EF(Teich) 57.1 %      EDV(cubed) 81.7 ml      ESV(cubed) 28.4 ml      EF(cubed) 65.3 %      LV mass(C)d 181.7 grams      LV mass(C)dI 103.7 grams/m^2      SV(Teich) 48.5 ml      SI(Teich) 27.7 ml/m^2      SV(cubed) 53.4 ml      SI(cubed) 30.5 ml/m^2      Ao root diam 3.6 cm      Ao root area 10.2 cm^2      LA dimension 4.3 cm      LA/Ao 1.2     LVOT diam 1.9 cm      LVOT area 2.8 cm^2      LVOT area(traced) 2.8 cm^2      RVOT diam 2.2 cm      RVOT area 3.8 cm^2      LVLd ap4 6.6 cm      EDV(MOD-sp4) 84.5 ml      LVLs ap4 6.3 cm      ESV(MOD-sp4) 31.9 ml      EF(MOD-sp4) 62.2 %      LVLd ap2 6.3 cm      EDV(MOD-sp2) 57.9 ml      LVLs ap2 4.9 cm      ESV(MOD-sp2) 24.7 ml      EF(MOD-sp2) 57.3 %      SV(MOD-sp4) 52.6 ml      SI(MOD-sp4) 30.0 ml/m^2      SV(MOD-sp2) 33.2 ml      SI(MOD-sp2) 18.9 ml/m^2      Ao root area (BSA corrected) 2.1     LV Portillo Vol (BSA corrected) 48.2 ml/m^2      LV Sys Vol (BSA corrected) 18.2 ml/m^2      MV E max symone 113.0 cm/sec      MV V2 max 134.0 cm/sec      MV max PG 7.2 mmHg      MV V2 mean 64.0 cm/sec      MV mean PG 2.0 mmHg      MV V2 VTI 26.3  cm      MVA(VTI) 1.6 cm^2      MV P1/2t max arben 144.0 cm/sec      MV P1/2t 57.0 msec      MVA(P1/2t) 3.9 cm^2      MV dec slope 740.0 cm/sec^2      MV dec time 0.2 sec      Ao pk arben 112.0 cm/sec      Ao max PG 5.0 mmHg      Ao max PG (full) 2.7 mmHg      Ao V2 mean 79.0 cm/sec      Ao mean PG 3.0 mmHg      Ao mean PG (full) 2.0 mmHg      Ao V2 VTI 21.7 cm      PETER(I,A) 1.9 cm^2      PETER(I,D) 1.9 cm^2      PETER(V,A) 1.9 cm^2      PETER(V,D) 1.9 cm^2      LV V1 max PG 2.3 mmHg      LV V1 mean PG 1.0 mmHg      LV V1 max 76.2 cm/sec      LV V1 mean 45.3 cm/sec      LV V1 VTI 14.6 cm      SV(Ao) 220.9 ml      SI(Ao) 126.0 ml/m^2      SV(LVOT) 41.4 ml      SV(RVOT) 48.7 ml      SI(LVOT) 23.6 ml/m^2      PA V2 max 81.9 cm/sec      PA max PG 2.7 mmHg      PA max PG (full) 1.3 mmHg       CV ECHO SYED - PVA(V,A) 2.7 cm^2       CV ECHO SYED - PVA(V,D) 2.7 cm^2      PA acc time 0.07 sec      RV V1 max PG 1.4 mmHg      RV V1 mean PG 1.0 mmHg      RV V1 max 59.1 cm/sec      RV V1 mean 40.9 cm/sec      RV V1 VTI 12.8 cm      TR max arben 284 cm/sec      PA pr(Accel) 45.7 mmHg      Pulm Sys Arben 24.8 cm/sec      Pulm Portillo Arben 23.5 cm/sec      Pulm S/D 1.1     Qp/Qs 1.2     Pulm A Revs Dur 0.16 sec      Pulm A Revs Arben 19.4 cm/sec      MVA P1/2T LCG 1.5 cm^2       CV ECHO SYED - BZI_BMI 29.8 kilograms/m^2       CV ECHO SYED - BSA([facility]) 1.8 m^2      BH CV ECHO SYED - BZI_METRIC_WEIGHT 73.9 kg      BH CV ECHO SYED - BZI_METRIC_HEIGHT 157.5 cm      Target HR (85%) 108 bpm      Max. Pred. HR (100%) 127 bpm       CV VAS BP RIGHT /65 mmHg      LA Volume Index 71.0 mL/m2      EF(MOD-bp) 57.0 %      RAP systole 8 mmHg      RVSP(TR) 32 mmHg     Narrative:       · There is limited visualization of all cardiac structures The study is   technically difficult for diagnosis.  · Left ventricular systolic function is normal. Calculated EF = 57.0%.   Estimated EF was in agreement with the calculated EF. Normal left   ventricular  cavity size noted. Left ventricular diastolic function was   indeterminate.       ECG 12 Lead [[id number]] Collected:  07/07/19 0201     Updated:  07/09/19 1235    Narrative:       HEART RATE= 65  bpm  RR Interval= 925  ms  TX Interval=   ms  P Horizontal Axis=   deg  P Front Axis=   deg  QRSD Interval= 96  ms  QT Interval= 428  ms  QRS Axis= 0  deg  T Wave Axis= 147  deg  - ABNORMAL ECG -  Atrial fibrillation  Nonspecific T abnrm, anterolateral leads , new c/w prior  Electronically Signed By: Lokesh Trinidad ([facility]) 09-Jul-2019 12:34:53  Date and Time of Study: 2019-07-07 02:01:41          Medication Review:   I have reviewed the patient's current medication list    Current Facility-Administered Medications:   •  acetaminophen (TYLENOL) tablet 1,000 mg, 1,000 mg, Oral, Q6H PRN, Ashu Rivera MD  •  bisacodyl (DULCOLAX) suppository 10 mg, 10 mg, Rectal, Daily PRN, Ashu Rivera MD  •  carvedilol (COREG) tablet 3.125 mg, 3.125 mg, Oral, BID With Meals, Lulú Lorenzo MD, 3.125 mg at 07/10/19 0850  •  docusate sodium (COLACE) capsule 200 mg, 200 mg, Oral, Daily, Asuh Rivera MD, 200 mg at 07/10/19 0850  •  donepezil (ARICEPT) tablet 10 mg, 10 mg, Oral, Nightly, Ashu Rivera MD, 10 mg at 07/09/19 2144  •  famotidine (PEPCID) tablet 20 mg, 20 mg, Oral, Daily, Ashu Rivera MD, 20 mg at 07/10/19 0851  •  Hold medication, 1 each, Does not apply, Continuous PRN, Lulú Lorenzo MD  •  ipratropium (ATROVENT) nebulizer solution 0.5 mg, 500 mcg, Nebulization, 4x Daily PRN, Ashu Rivera MD, 0.5 mg at 07/10/19 0553  •  levothyroxine (SYNTHROID, LEVOTHROID) tablet 88 mcg, 88 mcg, Oral, Daily, Ashu Rivera MD, 88 mcg at 07/10/19 0851  •  LORazepam (ATIVAN) tablet 0.5 mg, 0.5 mg, Oral, BID, Ashu Rivera MD, 0.5 mg at 07/10/19 0851  •  melatonin tablet 5 mg, 5 mg, Oral, Nightly, Ashu Rivera MD, 5 mg at 07/09/19 2148  •  memantine (NAMENDA) tablet 10  mg, 10 mg, Oral, Q12H, Ashu Rivera MD, 10 mg at 07/10/19 0850  •  mirtazapine (REMERON) tablet 15 mg, 15 mg, Oral, Nightly, Ashu Rivera MD, 15 mg at 07/09/19 2144  •  oxyCODONE-acetaminophen (PERCOCET) 5-325 MG per tablet 1 tablet, 1 tablet, Oral, Q4H PRN, Ashu Rivera MD, 1 tablet at 07/09/19 1745  •  sertraline (ZOLOFT) tablet 125 mg, 125 mg, Oral, Daily, Ashu Rivera MD, 125 mg at 07/10/19 0850  •  sodium bicarbonate tablet 650 mg, 650 mg, Oral, Daily, Ashu Rivera MD, 650 mg at 07/10/19 0849  •  [COMPLETED] Insert peripheral IV, , , Once **AND** sodium chloride 0.9 % flush 10 mL, 10 mL, Intravenous, PRN, London Bai MD      Assessment/Plan     -Acute on chronic hypoxic respiratory failure: Secondary to acute congestive heart failure with bilateral effusions.  Titrate down oxygen as tolerated.  2 L nasal cannula at baseline.     -Acute on chronic diastolic congestive heart failure: Cardiology is following. The patient's renal function did not tolerate diuresis.  Her Bumex has been stopped for now.  Will order daily weights.  Echo done 7/8/19 with normal ejection fraction.  CT of the chest shows moderate sized bilateral pleural effusions with associated atelectasis.  Continue incentive spirometry.  Procalcitonin here was negative, no signs of acute infectious process currently.  Cardiology suggested thoracentesis on the right the patient's Eliquis has been held.  Waiting 48 hours from her last dose of Eliquis to perform the thoracentesis, (tomorrow morning).  New T wave inversions were noted on EKG but the patient has no chest pain and troponin here is negative.  Cardiology does not recommend further ischemic evaluation.     -Acute kidney injury on chronic kidney disease stage III: Baseline creatinine appears to be approximately 1.4. Patient on her home dose of oral bicarbonate.  The patient's creatinine bumped up here likely secondary to diuresis.  All diuretics and the  patient's lisinopril have been stopped at this time.  No IV fluids for now as the patient is in acute heart failure.    Renal function is improving slowly.  Will monitor closely.    -Acute on chronic anemia and thrombocytopenia: Initially dropped at the time of admission.  Hemoglobin is now stable and platelets rising.  Continue to monitor.     -Chronic atrial fibrillation: The patient was anticoagulated with Eliquis. Eliquis is on hold as noted above, resume after procedure tomorrow.  Her rate is within normal limits on a low-dose of Coreg.     -Hypertension: Blood pressure is mostly within normal limits.  On home dose of Coreg.    Her lisinopril was stopped secondary to her acute kidney injury.  Continue to monitor.     -Hypothyroidism: TSH is within normal limits, home supplementation continued.     -Dementia: Mental status appears at baseline here.  Patient continued on her home dose of Aricept, Namenda, and Remeron.       -Anxiety and depression: Patient on her home dose of Ativan, Remeron, and Zoloft.  No acute issues here.     -Dyslipidemia: On no medications for this currently.  In this elderly female likely risks outweigh benefits.     -COPD: No acute issues here.  No wheezes on exam here. Patient on no inhalers.    Plan for disposition: Back to [facility] when able.    Lulú Lorenzo MD  07/10/19  4:41 PM

## 2019-07-10 NOTE — PLAN OF CARE
Problem: Patient Care Overview  Goal: Plan of Care Review  Outcome: Ongoing (interventions implemented as appropriate)   07/10/19 3374   Coping/Psychosocial   Plan of Care Reviewed With patient   OTHER   Outcome Summary VSS, no complaints voiced, remains on O2 at 4l vnc. Eliquis on hold for scheduled thoracentesis tomorrow.     Goal: Discharge Needs Assessment  Outcome: Ongoing (interventions implemented as appropriate)    Goal: Interprofessional Rounds/Family Conf  Outcome: Ongoing (interventions implemented as appropriate)      Problem: Fall Risk (Adult)  Goal: Absence of Fall  Outcome: Ongoing (interventions implemented as appropriate)      Problem: Pain, Chronic (Adult)  Goal: Acceptable Pain/Comfort Level and Functional Ability  Outcome: Ongoing (interventions implemented as appropriate)      Problem: Breathing Pattern Ineffective (Adult)  Goal: Effective Oxygenation/Ventilation  Outcome: Ongoing (interventions implemented as appropriate)    Goal: Anxiety/Fear Reduction  Outcome: Ongoing (interventions implemented as appropriate)      Problem: Skin Injury Risk (Adult)  Goal: Identify Related Risk Factors and Signs and Symptoms  Outcome: Outcome(s) achieved Date Met: 07/10/19    Goal: Skin Health and Integrity  Outcome: Ongoing (interventions implemented as appropriate)

## 2019-07-11 ENCOUNTER — APPOINTMENT (OUTPATIENT)
Dept: GENERAL RADIOLOGY | Facility: HOSPITAL | Age: 84
End: 2019-07-11

## 2019-07-11 ENCOUNTER — APPOINTMENT (OUTPATIENT)
Dept: ULTRASOUND IMAGING | Facility: HOSPITAL | Age: 84
End: 2019-07-11

## 2019-07-11 LAB
ALBUMIN SERPL-MCNC: 3.4 G/DL (ref 3.5–5.2)
ALBUMIN/GLOB SERPL: 1.5 G/DL
ALP SERPL-CCNC: 87 U/L (ref 39–117)
ALT SERPL W P-5'-P-CCNC: 8 U/L (ref 1–33)
ANION GAP SERPL CALCULATED.3IONS-SCNC: 11.3 MMOL/L (ref 5–15)
APPEARANCE FLD: CLEAR
AST SERPL-CCNC: 14 U/L (ref 1–32)
BASOPHILS # BLD AUTO: 0.02 10*3/MM3 (ref 0–0.2)
BASOPHILS NFR BLD AUTO: 0.3 % (ref 0–1.5)
BASOPHILS NFR FLD: 0 %
BILIRUB SERPL-MCNC: 0.4 MG/DL (ref 0.2–1.2)
BLASTS NFR FLD: 0 %
BUN BLD-MCNC: 40 MG/DL (ref 8–23)
BUN/CREAT SERPL: 22.5 (ref 7–25)
CALCIUM SPEC-SCNC: 9.5 MG/DL (ref 8.2–9.6)
CHLORIDE SERPL-SCNC: 104 MMOL/L (ref 98–107)
CO2 SERPL-SCNC: 28.7 MMOL/L (ref 22–29)
COLOR FLD: YELLOW
CREAT BLD-MCNC: 1.78 MG/DL (ref 0.57–1)
DEPRECATED RDW RBC AUTO: 58 FL (ref 37–54)
EOSINOPHIL # BLD AUTO: 0.1 10*3/MM3 (ref 0–0.4)
EOSINOPHIL NFR BLD AUTO: 1.6 % (ref 0.3–6.2)
EOSINOPHIL NFR FLD MANUAL: 0 %
ERYTHROCYTE [DISTWIDTH] IN BLOOD BY AUTOMATED COUNT: 15.4 % (ref 12.3–15.4)
GFR SERPL CREATININE-BSD FRML MDRD: 27 ML/MIN/1.73
GLOBULIN UR ELPH-MCNC: 2.3 GM/DL
GLUCOSE BLD-MCNC: 121 MG/DL (ref 65–99)
HCT VFR BLD AUTO: 32.9 % (ref 34–46.6)
HGB BLD-MCNC: 9.7 G/DL (ref 12–15.9)
IMM GRANULOCYTES # BLD AUTO: 0.04 10*3/MM3 (ref 0–0.05)
IMM GRANULOCYTES NFR BLD AUTO: 0.7 % (ref 0–0.5)
LDH FLD-CCNC: 94 U/L
LDH SERPL-CCNC: 214 U/L (ref 135–214)
LYMPHOCYTES # BLD AUTO: 0.56 10*3/MM3 (ref 0.7–3.1)
LYMPHOCYTES NFR BLD AUTO: 9.2 % (ref 19.6–45.3)
LYMPHOCYTES NFR FLD MANUAL: 99 %
MACROPHAGE FLUID: 0 %
MCH RBC QN AUTO: 30.9 PG (ref 26.6–33)
MCHC RBC AUTO-ENTMCNC: 29.5 G/DL (ref 31.5–35.7)
MCV RBC AUTO: 104.8 FL (ref 79–97)
MESOTHL CELL NFR FLD MANUAL: 0 %
MONOCYTES # BLD AUTO: 0.68 10*3/MM3 (ref 0.1–0.9)
MONOCYTES NFR BLD AUTO: 11.1 % (ref 5–12)
MONOCYTES NFR FLD: 0 %
MONOS+MACROS NFR FLD: 0 %
NEUTROPHILS # BLD AUTO: 4.7 10*3/MM3 (ref 1.7–7)
NEUTROPHILS NFR BLD AUTO: 77.1 % (ref 42.7–76)
NEUTROPHILS NFR FLD MANUAL: 1 %
NRBC BLD AUTO-RTO: 0 /100 WBC (ref 0–0.2)
PLASMA CELLS NFR FLD: 0 %
PLATELET # BLD AUTO: 112 10*3/MM3 (ref 140–450)
PMV BLD AUTO: 12.8 FL (ref 6–12)
POTASSIUM BLD-SCNC: 4 MMOL/L (ref 3.5–5.2)
PROT FLD-MCNC: 1.4 G/DL
PROT SERPL-MCNC: 5.7 G/DL (ref 6–8.5)
RBC # BLD AUTO: 3.14 10*6/MM3 (ref 3.77–5.28)
RBC # FLD AUTO: 3000 /MM3
SODIUM BLD-SCNC: 144 MMOL/L (ref 136–145)
UNCLASSIFIED CELLS, FLUID: 0 %
WBC # FLD AUTO: 233 /MM3
WBC NRBC COR # BLD: 6.1 10*3/MM3 (ref 3.4–10.8)

## 2019-07-11 PROCEDURE — 80053 COMPREHEN METABOLIC PANEL: CPT | Performed by: HOSPITALIST

## 2019-07-11 PROCEDURE — 87070 CULTURE OTHR SPECIMN AEROBIC: CPT | Performed by: HOSPITALIST

## 2019-07-11 PROCEDURE — 76942 ECHO GUIDE FOR BIOPSY: CPT

## 2019-07-11 PROCEDURE — 94799 UNLISTED PULMONARY SVC/PX: CPT

## 2019-07-11 PROCEDURE — 85025 COMPLETE CBC W/AUTO DIFF WBC: CPT | Performed by: HOSPITALIST

## 2019-07-11 PROCEDURE — 83615 LACTATE (LD) (LDH) ENZYME: CPT | Performed by: HOSPITALIST

## 2019-07-11 PROCEDURE — 89051 BODY FLUID CELL COUNT: CPT | Performed by: HOSPITALIST

## 2019-07-11 PROCEDURE — 71045 X-RAY EXAM CHEST 1 VIEW: CPT

## 2019-07-11 PROCEDURE — 87015 SPECIMEN INFECT AGNT CONCNTJ: CPT | Performed by: HOSPITALIST

## 2019-07-11 PROCEDURE — 0W993ZZ DRAINAGE OF RIGHT PLEURAL CAVITY, PERCUTANEOUS APPROACH: ICD-10-PCS | Performed by: HOSPITALIST

## 2019-07-11 PROCEDURE — 25010000003 LIDOCAINE 1 % SOLUTION: Performed by: FAMILY MEDICINE

## 2019-07-11 PROCEDURE — 87205 SMEAR GRAM STAIN: CPT | Performed by: HOSPITALIST

## 2019-07-11 PROCEDURE — 99232 SBSQ HOSP IP/OBS MODERATE 35: CPT | Performed by: NURSE PRACTITIONER

## 2019-07-11 PROCEDURE — 84157 ASSAY OF PROTEIN OTHER: CPT | Performed by: HOSPITALIST

## 2019-07-11 PROCEDURE — 99232 SBSQ HOSP IP/OBS MODERATE 35: CPT | Performed by: HOSPITALIST

## 2019-07-11 RX ORDER — BUSPIRONE HYDROCHLORIDE 5 MG/1
5 TABLET ORAL 3 TIMES DAILY
Status: DISCONTINUED | OUTPATIENT
Start: 2019-07-11 | End: 2019-07-12 | Stop reason: HOSPADM

## 2019-07-11 RX ORDER — LIDOCAINE HYDROCHLORIDE 10 MG/ML
5 INJECTION, SOLUTION INFILTRATION; PERINEURAL ONCE
Status: COMPLETED | OUTPATIENT
Start: 2019-07-11 | End: 2019-07-11

## 2019-07-11 RX ORDER — PETROLATUM 42 G/100G
OINTMENT TOPICAL EVERY 12 HOURS SCHEDULED
Status: DISCONTINUED | OUTPATIENT
Start: 2019-07-11 | End: 2019-07-12 | Stop reason: HOSPADM

## 2019-07-11 RX ADMIN — MELATONIN TAB 5 MG 5 MG: 5 TAB at 20:24

## 2019-07-11 RX ADMIN — DOCUSATE SODIUM 200 MG: 100 CAPSULE, LIQUID FILLED ORAL at 08:20

## 2019-07-11 RX ADMIN — CARVEDILOL 3.12 MG: 6.25 TABLET, FILM COATED ORAL at 08:20

## 2019-07-11 RX ADMIN — MEMANTINE HYDROCHLORIDE 10 MG: 5 TABLET ORAL at 20:25

## 2019-07-11 RX ADMIN — PETROLATUM: 42 OINTMENT TOPICAL at 09:40

## 2019-07-11 RX ADMIN — IPRATROPIUM BROMIDE 0.5 MG: 0.5 SOLUTION RESPIRATORY (INHALATION) at 03:23

## 2019-07-11 RX ADMIN — MIRTAZAPINE 15 MG: 15 TABLET, FILM COATED ORAL at 20:26

## 2019-07-11 RX ADMIN — SODIUM BICARBONATE 650 MG TABLET 650 MG: at 08:20

## 2019-07-11 RX ADMIN — BUSPIRONE HYDROCHLORIDE 5 MG: 5 TABLET ORAL at 20:54

## 2019-07-11 RX ADMIN — LIDOCAINE HYDROCHLORIDE 5 ML: 10 INJECTION, SOLUTION INFILTRATION; PERINEURAL at 11:00

## 2019-07-11 RX ADMIN — PETROLATUM: 42 OINTMENT TOPICAL at 20:26

## 2019-07-11 RX ADMIN — SERTRALINE HYDROCHLORIDE 125 MG: 50 TABLET ORAL at 08:20

## 2019-07-11 RX ADMIN — LORAZEPAM 0.5 MG: 0.5 TABLET ORAL at 08:20

## 2019-07-11 RX ADMIN — FAMOTIDINE 20 MG: 20 TABLET, FILM COATED ORAL at 08:20

## 2019-07-11 RX ADMIN — MEMANTINE HYDROCHLORIDE 10 MG: 5 TABLET ORAL at 08:20

## 2019-07-11 RX ADMIN — LORAZEPAM 0.5 MG: 0.5 TABLET ORAL at 20:53

## 2019-07-11 RX ADMIN — DONEPEZIL HYDROCHLORIDE 10 MG: 5 TABLET, FILM COATED ORAL at 20:26

## 2019-07-11 RX ADMIN — LEVOTHYROXINE SODIUM 88 MCG: 0.09 TABLET ORAL at 08:20

## 2019-07-11 RX ADMIN — IPRATROPIUM BROMIDE 0.5 MG: 0.5 SOLUTION RESPIRATORY (INHALATION) at 13:50

## 2019-07-11 RX ADMIN — CARVEDILOL 3.12 MG: 6.25 TABLET, FILM COATED ORAL at 18:02

## 2019-07-11 RX ADMIN — IPRATROPIUM BROMIDE 0.5 MG: 0.5 SOLUTION RESPIRATORY (INHALATION) at 20:07

## 2019-07-11 NOTE — PROGRESS NOTES
"    Patient Name: Amparo Veragra  :3/26/1926  89 y.o.      Patient Care Team:  Nikita Castellon MD as PCP - General (Family Medicine)  Provider, No Known as PCP - Family Medicine    Chief Complaint: acute/chronic hypoxic respiratory failure and congestion heart failure    Interval History: Denies chest pain, states \"I am having a hard time breathing\".  Had to increase 02 up to 5L during the early hours.  Use of accessory muscles for work of breathing.       Objective   Vital Signs  Temp:  [97.8 °F (36.6 °C)-98.4 °F (36.9 °C)] 97.8 °F (36.6 °C)  Heart Rate:  [58-92] 70  Resp:  [14-31] 20  BP: ()/(59-84) 126/84    Intake/Output Summary (Last 24 hours) at 2019 0845  Last data filed at 7/10/2019 1900  Gross per 24 hour   Intake 300 ml   Output --   Net 300 ml     Flowsheet Rows      First Filed Value   Admission Height  157.5 cm (62\") Documented at 2019 015   Admission Weight  78.5 kg (173 lb) Documented at 2019 0151          Physical Exam:   General Appearance:    Alert, cooperative, appears uncomfortable.   Lungs:     Diminished all lobes,  Increased respiratory effort and rate.      Heart:    Irregular rhythm and normal rate, normal S1 and S2, no murmurs, gallops or rubs.     Chest Wall:    No abnormalities observed   Abdomen:     Soft, nontender, positive bowel sounds.     Extremities:   no cyanosis, clubbing or edema.  No marked joint deformities.  Adequate musculoskeletal strength.       Results Review:    Results from last 7 days   Lab Units 19  0435   SODIUM mmol/L 144   POTASSIUM mmol/L 4.0   CHLORIDE mmol/L 104   CO2 mmol/L 28.7   BUN mg/dL 40*   CREATININE mg/dL 1.78*   GLUCOSE mg/dL 121*   CALCIUM mg/dL 9.5     Results from last 7 days   Lab Units 19  0836 19  0205   TROPONIN T ng/mL <0.010 <0.010     Results from last 7 days   Lab Units 19  0435   WBC 10*3/mm3 6.10   HEMOGLOBIN g/dL 9.7*   HEMATOCRIT % 32.9*   PLATELETS 10*3/mm3 112*     Results from last " days   Lab Units 07/10/19  0404 07/07/19  0205   INR  1.17* 1.21*   APTT seconds  --  50.6*     Results from last 7 days   Lab Units 07/08/19  0730   MAGNESIUM mg/dL 2.0                   Medication Review:     carvedilol 3.125 mg Oral BID With Meals   docusate sodium 200 mg Oral Daily   donepezil 10 mg Oral Nightly   famotidine 20 mg Oral Daily   hydrophor  Topical Q12H   levothyroxine 88 mcg Oral Daily   LORazepam 0.5 mg Oral BID   melatonin 5 mg Oral Nightly   memantine 10 mg Oral Q12H   mirtazapine 15 mg Oral Nightly   sertraline 125 mg Oral Daily   sodium bicarbonate 650 mg Oral Daily          hold 1 each       Assessment/Plan       1.  Acute on chronic respiratory failure with hypoxemia (CMS/HCC) - 3 L NC,  Episode of increased SOA during early morning and 02 increased to 5 L.  Appears anxious.  Ativan given, per RN.  02 down to 3L.  There is use of accessory muscles.     2.  Acute on chronic dCHF - echo 7/8/19 -   · There is limited visualization of all cardiac structures The study is technically difficult for diagnosis.  · Left ventricular systolic function is normal. Calculated EF = 57.0%. Estimated EF was in agreement with the calculated EF. Normal left ventricular cavity size noted. Left ventricular diastolic function was indeterminate.     3..  Chronic cor pulmonale     4.  Bilateral pleural effusions, R>L - scheduled for R thoracentesis for today.  Apixaban on hold since 7/9/19. Recommend restarting after procedure tomorrow.     5.  EKG changes - new T wave inversion noted.  No angina.  Given advanced age, renal dysfunction and dementia, would not recommend further ischemic evaluation.     6.  Chronic atrial fibrillation, rate controlled. On apixaban but this is on hold for Right thoracentesis today     7.  LOLLY on CKD - oral diuretics stopped, creatinine continues to trend downward, now 1.78.     8.  COPD - chronic 02,  Now on 3L per NC  9.  Chronic venous insufficiency  10. HTN- stable  11.  Dementia      Hopefully, she will have some resolution with her increased work of breathing and SOA after thoracentesis today.    Will follow        RAFFY Barlow  [facility]  07/11/19  8:45 AM

## 2019-07-11 NOTE — NURSING NOTE
Patient complains of shortness of breath.  Informed Respiratory Therapy that patient request breathing treatment. Patient assessment indicative of wheezing, labored breathing and pulse ox of 80%.  Instructed patient to breath in through nose and out through mouth as though blowing out candle.  Patient o2 sats went up to 88% and stayed within 84-88%.  Increased o2 to 5L    Spoke to respiratory again to inform of oxygen saturation level and increase in oxygen to 5liters.  Respiratory states not time for another breathing treatment but will administer when he is able to allowed.

## 2019-07-11 NOTE — PLAN OF CARE
Problem: Patient Care Overview  Goal: Plan of Care Review  Outcome: Ongoing (interventions implemented as appropriate)      Problem: Fall Risk (Adult)  Goal: Absence of Fall  Outcome: Ongoing (interventions implemented as appropriate)      Problem: Pain, Chronic (Adult)  Goal: Acceptable Pain/Comfort Level and Functional Ability  Outcome: Ongoing (interventions implemented as appropriate)      Problem: Breathing Pattern Ineffective (Adult)  Goal: Effective Oxygenation/Ventilation  Outcome: Ongoing (interventions implemented as appropriate)    Goal: Anxiety/Fear Reduction  Outcome: Ongoing (interventions implemented as appropriate)      Problem: Skin Injury Risk (Adult)  Goal: Skin Health and Integrity  Outcome: Ongoing (interventions implemented as appropriate)

## 2019-07-11 NOTE — PROGRESS NOTES
"Hospitalist Team      Patient Care Team:  Nikita Castellon MD as PCP - General (Family Medicine)  Provider, No Known as PCP - Family Medicine        Chief Complaint:  Acute on chronic hypoxic respiratory failure and congestive heart failure with pleural effusions.      Subjective    Interval History and ROS:     Per nursing staff this morning the patient had an episode of significant increase in shortness of breath prior to her thoracentesis.  Nursing was concerned that some of the patient's increased shortness of air may be secondary to anxiety.  Her symptoms seem to wax and wane.  The time of my examination the patient denies any current shortness of breath.  This is following her thoracentesis this morning where 500 cc of fluid was removed.  The patient is afebrile.  She appears to be resting comfortably at the time of my examination and oxygen has been titrated down to 3 L nasal cannula.  Denies any chest pain, nausea, vomiting.      Objective    Vital Signs  Temp:  [96.9 °F (36.1 °C)-98.4 °F (36.9 °C)] 97.3 °F (36.3 °C)  Heart Rate:  [60-92] 60  Resp:  [16-31] 18  BP: ()/(59-84) 119/74  Oxygen Therapy  SpO2: 91 %  Pulse Oximetry Type: Intermittent  Device (Oxygen Therapy): nasal cannula  Flow (L/min): 3     Flowsheet Rows      First Filed Value   Admission Height  157.5 cm (62\") Documented at 07/07/2019 0151   Admission Weight  78.5 kg (173 lb) Documented at 07/07/2019 0151            Physical Exam:  Physical Exam   Constitutional: She appears well-developed. No distress.   Elderly female   HENT:   Head: Normocephalic and atraumatic.   Mouth/Throat: Oropharynx is clear and moist.   Eyes: Conjunctivae and EOM are normal. No scleral icterus.   Neck: Neck supple. No JVD present.   Cardiovascular: Normal rate. Exam reveals no friction rub.   No murmur heard.  Irregularly irregular rhythm   Pulmonary/Chest: She has no wheezes.  No increased work of breathing.  Diminished breath sounds still at the bilateral " bases.  Few faint Rales noted bilaterally lower lungs.     Abdominal: Soft. Bowel sounds are normal. There is no tenderness.   Musculoskeletal: She exhibits no edema.   Neurological: She is alert.   Oriented x2   Skin: Skin is warm and dry.  Chronic stasis changes of the bilateral lower extremities.      Results Review:     I reviewed the patient's new clinical results.    Lab Results (last 24 hours)     Procedure Component Value Units Date/Time    Protein, Body Fluid - Pleural Fluid, Pleural Cavity [[id number]] Collected:  07/11/19 1010    Specimen:  Pleural Fluid from Pleural Cavity Updated:  07/11/19 1613     Protein, Total, Fluid 1.4 g/dL     Narrative:       No Reference Ranges Established.    A serous fluid total fluid (TP) greater than 50 percent of the serum TP suggests the fluid is an exudate.      1. Pleural TP/Serum TP >0.5  2. Pleural LD/Serum LD >0.6  3. Pleural LD >2/3 of the upper limit of normal for serum LDH    This test was developed, it performance characteristics determined and judged suitable for clinical purposes by [facility].  It has not been cleared or approved by the FDA.  The laboratory is regulated under CLIA as qualified to perform high-complexity testing.     Lactate Dehydrogenase, Body Fluid - Pleural Fluid, Pleural Cavity [[id number]] Collected:  07/11/19 1010    Specimen:  Pleural Fluid from Pleural Cavity Updated:  07/11/19 1613     Lactate Dehydrogenase (LD), Fluid 94 U/L     Narrative:       No Reference Ranges Established.    Serous fluid LDH greater than 60 percent of the serum LDH or serous fluid LDH two-thirds of the upper limit of normal for serum LDH suggests the fluid is an exudate.     1. Pleural TP/Serum TP >0.5  2. Pleural LD/Serum LD >0.6  3. Pleural LD >2/3 of the upper limit of normal for serum LDH    This test was developed, it performance characteristics determined and judged suitable for clinical purposes by [facility].  It has not been cleared or approved by the FDA.  The laboratory is regulated under CLIA as qualified to perform high-complexity testing.     Body Fluid Culture - Body Fluid, Pleural Cavity [[id number]] Collected:  07/11/19 1011    Specimen:  Body Fluid from Pleural Cavity Updated:  07/11/19 1247     Gram Stain Occasional WBCs seen      No organisms seen    Body Fluid Cell Count With Differential - Pleural Fluid, Pleural Cavity [[id number]] Collected:  07/11/19 1010    Specimen:  Pleural Fluid from Pleural Cavity Updated:  07/11/19 1125    Narrative:       The following orders were created for panel order Body Fluid Cell Count With Differential - Pleural Fluid, Pleural Cavity.  Procedure                               Abnormality         Status                     ---------                               -----------         ------                     Body fluid cell count - ...[[id number]]                      Final result               Body fluid differential ...[[id number]]                      Final result                 Please view results for these tests on the individual orders.    Body fluid differential - Pleural Fluid, Pleural Cavity [[id number]] Collected:  07/11/19 1010    Specimen:  Pleural Fluid from Pleural Cavity Updated:  07/11/19 1125     Neutrophils, Fluid 1 %      Lymphocytes, Fluid 99 %      Monocytes, Fluid 0 %      Eosinophils, Fluid 0 %      Basophils, Fluid 0 %      Mononuclear, Fluid 0 %      Plasma Cells, Fluid 0 %      Blasts, Fluid 0 %      Unclassified Cells, Fluid 0 %      Mesothelial Cells, Fluid 0 %      Macrophage, Fluid 0 %     Body fluid cell count - Pleural Fluid, Pleural Cavity [[id number]] Collected:  07/11/19 1010    Specimen:  Pleural Fluid from Pleural Cavity Updated:  07/11/19 1050     Color, Fluid Yellow     Appearance, Fluid Clear     WBC, Fluid 233 /mm3      RBC, Fluid 3,000 /mm3     Comprehensive Metabolic Panel [[id number]]  (Abnormal) Collected:  07/11/19 5898     Specimen:  Blood Updated:  07/11/19 0557     Glucose 121 mg/dL      BUN 40 mg/dL      Creatinine 1.78 mg/dL      Sodium 144 mmol/L      Potassium 4.0 mmol/L      Chloride 104 mmol/L      CO2 28.7 mmol/L      Calcium 9.5 mg/dL      Total Protein 5.7 g/dL      Albumin 3.40 g/dL      ALT (SGPT) 8 U/L      AST (SGOT) 14 U/L      Alkaline Phosphatase 87 U/L      Total Bilirubin 0.4 mg/dL      eGFR Non African Amer 27 mL/min/1.73      Globulin 2.3 gm/dL      A/G Ratio 1.5 g/dL      BUN/Creatinine Ratio 22.5     Anion Gap 11.3 mmol/L     Narrative:       GFR Normal >60  Chronic Kidney Disease <60  Kidney Failure <15    Lactate Dehydrogenase [[id number]]  (Normal) Collected:  07/11/19 0435    Specimen:  Blood Updated:  07/11/19 0555      U/L     CBC & Differential [[id number]] Collected:  07/11/19 0435    Specimen:  Blood Updated:  07/11/19 0544    Narrative:       The following orders were created for panel order CBC & Differential.  Procedure                               Abnormality         Status                     ---------                               -----------         ------                     CBC Auto Differential[[id number]]        Abnormal            Final result                 Please view results for these tests on the individual orders.    CBC Auto Differential [[id number]]  (Abnormal) Collected:  07/11/19 0435    Specimen:  Blood Updated:  07/11/19 0544     WBC 6.10 10*3/mm3      RBC 3.14 10*6/mm3      Hemoglobin 9.7 g/dL      Hematocrit 32.9 %      .8 fL      MCH 30.9 pg      MCHC 29.5 g/dL      RDW 15.4 %      RDW-SD 58.0 fl      MPV 12.8 fL      Platelets 112 10*3/mm3      Neutrophil % 77.1 %      Lymphocyte % 9.2 %      Monocyte % 11.1 %      Eosinophil % 1.6 %      Basophil % 0.3 %      Immature Grans % 0.7 %      Neutrophils, Absolute 4.70 10*3/mm3      Lymphocytes, Absolute 0.56 10*3/mm3      Monocytes, Absolute 0.68 10*3/mm3      Eosinophils, Absolute 0.10 10*3/mm3      Basophils,  Absolute 0.02 10*3/mm3      Immature Grans, Absolute 0.04 10*3/mm3      nRBC 0.0 /100 WBC           Imaging Results (last 24 hours)     Procedure Component Value Units Date/Time    XR Chest 1 View [[id number]] Collected:  07/11/19 1616     Updated:  07/11/19 1620    Narrative:       CHEST X-RAY, 07/11/2019 (16:01)     HISTORY:  89-year-old female with acute on chronic respiratory failure. Shortness  of air. Thoracentesis earlier today.     TECHNIQUE:  AP upright chest x-ray.     FINDINGS:  No pneumothorax following thoracentesis earlier today. Small bilateral  pleural effusions with bilateral lung base atelectasis, greater on the  right. Cardiomegaly and mild vascular congestion, unchanged.       Impression:       Stable portable chest x-ray, unchanged since earlier today.     This report was finalized on 7/11/2019 4:18 PM by Dr. Kenneth Queen MD.       US Thoracentesis [[id number]] Collected:  07/11/19 1048    Specimen:  Body Fluid Updated:  07/11/19 1055    Narrative:       ULTRASOUND-GUIDED RIGHT THORACENTESIS, 07/11/2019     HISTORY:   89-year-old female hospital inpatient with acute on chronic respiratory  failure and congestive heart failure. Moderate right and small left  pleural effusions on recent chest CT with right lung base atelectasis.     CONSENT:   The procedure, risks and alternatives were discussed in detail with the  patient, emphasizing the risk of bleeding and pneumothorax. There are  Eliquis was halted 48 hours prior. Her questions were entertained, and  written informed consent was obtained. Timeout was observed in the  procedure room for patient identification and procedure site  verification, and the procedure site was marked by me. Permanent images  were recorded.     PROCEDURE:   Using sterile technique, 1% lidocaine local anesthetic and real-time  ultrasound guidance, a 5 Welsh thoracentesis catheter was placed into  the largest portion of the right pleural effusion at the right lung  base  from a posterior intercostal approach without difficulty. 500 cc clear,  yellow-orange fluid was removed. No additional fluid could be obtained.     Pleural fluid sample was sent for requested laboratory studies. The  patient remained stable in the department during and after the  procedure. Postprocedure chest x-ray shows no pneumothorax.       Impression:       Technically successful ultrasound-guided right thoracentesis with  removal of 500 cc effusion fluid.     This report was finalized on 7/11/2019 10:53 AM by Dr. Kenneth Queen MD.       XR Chest 1 View [[id number]] Collected:  07/11/19 1046     Updated:  07/11/19 1053    Narrative:       POSTPROCEDURE CHEST X-RAY, 07/11/2019 (10:21)         HISTORY:  Chest x-ray following ultrasound-guided right thoracentesis.     TECHNIQUE:  AP portable upright chest x-ray.     COMPARISON:  *  Chest x-ray, 07/07/2019.     FINDINGS:  No visible pneumothorax following right thoracentesis. Right pleural  effusion is decreased in size. Small effusions remain along with  bibasilar pulmonary atelectasis.     Pulmonary venous redistribution and mildly increased interstitial  markings suggesting mild vascular congestion. Stable moderate  cardiomegaly.       Impression:       1. No pneumothorax following right thoracentesis.  2. Right pleural effusion decreased in size. The exam is otherwise  unchanged.     This report was finalized on 7/11/2019 10:48 AM by Dr. Kenneth Queen MD.           ECG/EMG Results (most recent)     Procedure Component Value Units Date/Time    Adult Transthoracic Echo Complete W/ Cont if Necessary Per Protocol [[id number]] Collected:  07/08/19 0838     Updated:  07/08/19 1543     BSA 1.8 m^2      IVSd 1.2 cm      LVIDd 4.3 cm      LVIDs 3.1 cm      LVPWd 1.1 cm      IVS/LVPW 1.1     FS 29.7 %      EDV(Teich) 84.9 ml      ESV(Teich) 36.4 ml      EF(Teich) 57.1 %      EDV(cubed) 81.7 ml      ESV(cubed) 28.4 ml      EF(cubed) 65.3 %      LV mass(C)d  181.7 grams      LV mass(C)dI 103.7 grams/m^2      SV(Teich) 48.5 ml      SI(Teich) 27.7 ml/m^2      SV(cubed) 53.4 ml      SI(cubed) 30.5 ml/m^2      Ao root diam 3.6 cm      Ao root area 10.2 cm^2      LA dimension 4.3 cm      LA/Ao 1.2     LVOT diam 1.9 cm      LVOT area 2.8 cm^2      LVOT area(traced) 2.8 cm^2      RVOT diam 2.2 cm      RVOT area 3.8 cm^2      LVLd ap4 6.6 cm      EDV(MOD-sp4) 84.5 ml      LVLs ap4 6.3 cm      ESV(MOD-sp4) 31.9 ml      EF(MOD-sp4) 62.2 %      LVLd ap2 6.3 cm      EDV(MOD-sp2) 57.9 ml      LVLs ap2 4.9 cm      ESV(MOD-sp2) 24.7 ml      EF(MOD-sp2) 57.3 %      SV(MOD-sp4) 52.6 ml      SI(MOD-sp4) 30.0 ml/m^2      SV(MOD-sp2) 33.2 ml      SI(MOD-sp2) 18.9 ml/m^2      Ao root area (BSA corrected) 2.1     LV Portillo Vol (BSA corrected) 48.2 ml/m^2      LV Sys Vol (BSA corrected) 18.2 ml/m^2      MV E max symone 113.0 cm/sec      MV V2 max 134.0 cm/sec      MV max PG 7.2 mmHg      MV V2 mean 64.0 cm/sec      MV mean PG 2.0 mmHg      MV V2 VTI 26.3 cm      MVA(VTI) 1.6 cm^2      MV P1/2t max symone 144.0 cm/sec      MV P1/2t 57.0 msec      MVA(P1/2t) 3.9 cm^2      MV dec slope 740.0 cm/sec^2      MV dec time 0.2 sec      Ao pk symone 112.0 cm/sec      Ao max PG 5.0 mmHg      Ao max PG (full) 2.7 mmHg      Ao V2 mean 79.0 cm/sec      Ao mean PG 3.0 mmHg      Ao mean PG (full) 2.0 mmHg      Ao V2 VTI 21.7 cm      PETER(I,A) 1.9 cm^2      PETER(I,D) 1.9 cm^2      PETER(V,A) 1.9 cm^2      PETER(V,D) 1.9 cm^2      LV V1 max PG 2.3 mmHg      LV V1 mean PG 1.0 mmHg      LV V1 max 76.2 cm/sec      LV V1 mean 45.3 cm/sec      LV V1 VTI 14.6 cm      SV(Ao) 220.9 ml      SI(Ao) 126.0 ml/m^2      SV(LVOT) 41.4 ml      SV(RVOT) 48.7 ml      SI(LVOT) 23.6 ml/m^2      PA V2 max 81.9 cm/sec      PA max PG 2.7 mmHg      PA max PG (full) 1.3 mmHg      BH CV ECHO SYED - PVA(V,A) 2.7 cm^2      BH CV ECHO SYED - PVA(V,D) 2.7 cm^2      PA acc time 0.07 sec      RV V1 max PG 1.4 mmHg      RV V1 mean PG 1.0 mmHg      RV V1 max  59.1 cm/sec      RV V1 mean 40.9 cm/sec      RV V1 VTI 12.8 cm      TR max arben 284 cm/sec      PA pr(Accel) 45.7 mmHg      Pulm Sys Arben 24.8 cm/sec      Pulm Portillo Arben 23.5 cm/sec      Pulm S/D 1.1     Qp/Qs 1.2     Pulm A Revs Dur 0.16 sec      Pulm A Revs Arben 19.4 cm/sec      MVA P1/2T LCG 1.5 cm^2       CV ECHO SYED - BZI_BMI 29.8 kilograms/m^2       CV ECHO SYED - BSA(HAYCOCK) 1.8 m^2       CV ECHO SYED - BZI_METRIC_WEIGHT 73.9 kg       CV ECHO SYED - BZI_METRIC_HEIGHT 157.5 cm      Target HR (85%) 108 bpm      Max. Pred. HR (100%) 127 bpm       CV VAS BP RIGHT /65 mmHg      LA Volume Index 71.0 mL/m2      EF(MOD-bp) 57.0 %      RAP systole 8 mmHg      RVSP(TR) 32 mmHg     Narrative:       · There is limited visualization of all cardiac structures The study is   technically difficult for diagnosis.  · Left ventricular systolic function is normal. Calculated EF = 57.0%.   Estimated EF was in agreement with the calculated EF. Normal left   ventricular cavity size noted. Left ventricular diastolic function was   indeterminate.       ECG 12 Lead [[id number]] Collected:  07/07/19 0201     Updated:  07/09/19 1235    Narrative:       HEART RATE= 65  bpm  RR Interval= 925  ms  ID Interval=   ms  P Horizontal Axis=   deg  P Front Axis=   deg  QRSD Interval= 96  ms  QT Interval= 428  ms  QRS Axis= 0  deg  T Wave Axis= 147  deg  - ABNORMAL ECG -  Atrial fibrillation  Nonspecific T abnrm, anterolateral leads , new c/w prior  Electronically Signed By: Lokesh Trinidad ([facility]) 09-Jul-2019 12:34:53  Date and Time of Study: 2019-07-07 02:01:41          Medication Review:   I have reviewed the patient's current medication list    Current Facility-Administered Medications:   •  acetaminophen (TYLENOL) tablet 1,000 mg, 1,000 mg, Oral, Q6H PRN, Ashu Rivera MD  •  bisacodyl (DULCOLAX) suppository 10 mg, 10 mg, Rectal, Daily PRN, Ashu Rivera MD  •  carvedilol (COREG) tablet 3.125 mg, 3.125 mg, Oral, BID With  Meals, Lulú Lorenzo MD, 3.125 mg at 07/11/19 0820  •  docusate sodium (COLACE) capsule 200 mg, 200 mg, Oral, Daily, Ashu Rivera MD, 200 mg at 07/11/19 0820  •  donepezil (ARICEPT) tablet 10 mg, 10 mg, Oral, Nightly, Ashu Rivera MD, 10 mg at 07/10/19 2034  •  famotidine (PEPCID) tablet 20 mg, 20 mg, Oral, Daily, Ashu Rivera MD, 20 mg at 07/11/19 0820  •  Hold medication, 1 each, Does not apply, Continuous PRN, Lulú Lorenzo MD  •  hydrophor (AQUAPHOR) ointment, , Topical, Q12H, Lulú Lorenzo MD  •  ipratropium (ATROVENT) nebulizer solution 0.5 mg, 500 mcg, Nebulization, 4x Daily PRN, Ashu Rivera MD, 0.5 mg at 07/11/19 1350  •  levothyroxine (SYNTHROID, LEVOTHROID) tablet 88 mcg, 88 mcg, Oral, Daily, Ashu Rivera MD, 88 mcg at 07/11/19 0820  •  LORazepam (ATIVAN) tablet 0.5 mg, 0.5 mg, Oral, BID, Ashu Rivera MD, 0.5 mg at 07/11/19 0820  •  melatonin tablet 5 mg, 5 mg, Oral, Nightly, Ashu Rivera MD, 5 mg at 07/10/19 2035  •  memantine (NAMENDA) tablet 10 mg, 10 mg, Oral, Q12H, Ashu Rivera MD, 10 mg at 07/11/19 0820  •  mirtazapine (REMERON) tablet 15 mg, 15 mg, Oral, Nightly, Ashu Rivera MD, 15 mg at 07/10/19 2034  •  oxyCODONE-acetaminophen (PERCOCET) 5-325 MG per tablet 1 tablet, 1 tablet, Oral, Q4H PRN, Ashu Rivera MD, 1 tablet at 07/09/19 1745  •  sertraline (ZOLOFT) tablet 125 mg, 125 mg, Oral, Daily, Ashu Rivera MD, 125 mg at 07/11/19 0820  •  sodium bicarbonate tablet 650 mg, 650 mg, Oral, Daily, Ashu Rivera MD, 650 mg at 07/11/19 0820  •  [COMPLETED] Insert peripheral IV, , , Once **AND** sodium chloride 0.9 % flush 10 mL, 10 mL, Intravenous, PRN, London Bai MD      Assessment/Plan     -Acute on chronic hypoxic respiratory failure: Secondary to acute congestive heart failure with bilateral effusions.  Titrate down oxygen as tolerated.  2 L nasal cannula at baseline.   On 3 L nasal cannula today.     -Acute on chronic diastolic congestive heart failure: Cardiology is following. The patient's renal function did not tolerate diuresis.  Her Bumex remains on hold.  Continue daily weights.  Based on our weights patient is down nearly 18 pounds from the time of admission (?  Accuracy as these are bed scale weights).  Echo done 7/8/19 with normal ejection fraction.  CT of the chest showed moderate sized bilateral pleural effusions with associated atelectasis.  Continue incentive spirometry.  Procalcitonin here was negative, no signs of acute infectious process currently.  Cardiology suggested thoracentesis on the right, the patient's Eliquis has been held.  Patient underwent thoracentesis today and 500 cc of fluid was removed.  Light's criteria are consistent with a transudative effusion which is consistent with her diagnosis of heart failure.  Chest x-ray following thoracentesis shows only small effusions with associated atelectasis remaining.  I will start the patient on incentive spirometry.  If renal function continues to improve I should be able to resume the patient's Bumex in the next day or 2.  Await further cardiology recommendations.    -New EKG changes: T wave inversions were noted on EKG but the patient has no chest pain and troponin here is negative.  Cardiology does not recommend further ischemic evaluation.     -Acute kidney injury on chronic kidney disease stage III: Baseline creatinine appears to be approximately 1.4. Patient on her home dose of oral bicarbonate.  The patient's creatinine bumped up here likely secondary to diuresis.  All diuretics and the patient's lisinopril have been stopped at this time.  No IV fluids for now as the patient is in acute heart failure.    Renal function is improving.  Will monitor closely.  Hopefully can resume diuretics soon.     -Acute on chronic anemia and thrombocytopenia: Initially dropped at the time of admission.  Hemoglobin is  now rising and platelets stable.  Continue to monitor.     -Chronic atrial fibrillation: The patient was anticoagulated with Eliquis. Eliquis is on hold as noted above, I will order the patient's Eliquis to resume in the a.m.  Her rate is within normal limits on a low-dose of Coreg.     -Hypertension: Blood pressure is mostly within normal limits.  On home dose of Coreg.    Her lisinopril was stopped secondary to her acute kidney injury.  Continue to monitor.     -Hypothyroidism: TSH is within normal limits, home supplementation continued.     -Dementia: Mental status appears at baseline here.  Patient continued on her home dose of Aricept, Namenda, and Remeron.       -Anxiety and depression: Patient on her home dose of Ativan, Remeron, and Zoloft.  Nursing staff has voiced concern that some of the patient's episodes of increased shortness of breath are related to anxiety here.  This morning's episode seemed to resolve following the patient's morning dose of Ativan.  I am going to add a low-dose of BuSpar 3 times daily to the patient's current regimen.  Continue to monitor closely for effect.     -Dyslipidemia: On no medications for this currently.  In this elderly female likely risks outweigh benefits.     -COPD: No acute issues here.  No wheezes on exam here.  Continue as needed Atrovent.    Plan for disposition: Back to [facility] when able.    Lulú Lorenzo MD  07/11/19  4:33 PM

## 2019-07-11 NOTE — NURSING NOTE
Continued Stay Note  BETY Coleman     Patient Name: Amparo Vergara  MRN: [id number]  Today's Date: 7/11/2019    Admit Date: 7/7/2019    Discharge Plan     Row Name 07/11/19 1345       Plan    Plan Comments  Awaiting thoracentesis.  Plan remains to return to [place].  Will continue to follow        Discharge Codes    No documentation.             Jess Vargas RN

## 2019-07-11 NOTE — PLAN OF CARE
Problem: Patient Care Overview  Goal: Plan of Care Review  Outcome: Ongoing (interventions implemented as appropriate)   07/10/19 2108   Coping/Psychosocial   Plan of Care Reviewed With patient   Plan of Care Review   Progress improving     Goal: Individualization and Mutuality  Outcome: Ongoing (interventions implemented as appropriate)      Problem: Breathing Pattern Ineffective (Adult)  Intervention: Optimize Oxygenation/Ventilation/Perfusion   07/10/19 2108   Respiratory Interventions   Airway/Ventilation Management airway patency maintained;calming measures promoted;humidification applied   Breathing Techniques/Airway Clearance pursed-lip breathing encouraged   Positioning   Head of Bed (HOB) HOB elevated

## 2019-07-12 VITALS
HEIGHT: 62 IN | SYSTOLIC BLOOD PRESSURE: 117 MMHG | RESPIRATION RATE: 18 BRPM | HEART RATE: 68 BPM | BODY MASS INDEX: 28.56 KG/M2 | OXYGEN SATURATION: 95 % | TEMPERATURE: 98 F | DIASTOLIC BLOOD PRESSURE: 61 MMHG | WEIGHT: 155.2 LBS

## 2019-07-12 LAB
ANION GAP SERPL CALCULATED.3IONS-SCNC: 10.7 MMOL/L (ref 5–15)
BUN BLD-MCNC: 34 MG/DL (ref 8–23)
BUN/CREAT SERPL: 20.7 (ref 7–25)
CALCIUM SPEC-SCNC: 9.6 MG/DL (ref 8.2–9.6)
CHLORIDE SERPL-SCNC: 104 MMOL/L (ref 98–107)
CO2 SERPL-SCNC: 27.3 MMOL/L (ref 22–29)
CREAT BLD-MCNC: 1.64 MG/DL (ref 0.57–1)
GFR SERPL CREATININE-BSD FRML MDRD: 29 ML/MIN/1.73
GLUCOSE BLD-MCNC: 104 MG/DL (ref 65–99)
POTASSIUM BLD-SCNC: 3.8 MMOL/L (ref 3.5–5.2)
SODIUM BLD-SCNC: 142 MMOL/L (ref 136–145)

## 2019-07-12 PROCEDURE — 99232 SBSQ HOSP IP/OBS MODERATE 35: CPT | Performed by: INTERNAL MEDICINE

## 2019-07-12 PROCEDURE — 99238 HOSP IP/OBS DSCHRG MGMT 30/<: CPT | Performed by: HOSPITALIST

## 2019-07-12 PROCEDURE — 80048 BASIC METABOLIC PNL TOTAL CA: CPT | Performed by: HOSPITALIST

## 2019-07-12 RX ORDER — BUMETANIDE 1 MG/1
1 TABLET ORAL EVERY MORNING
Start: 2019-07-12

## 2019-07-12 RX ORDER — BUSPIRONE HYDROCHLORIDE 15 MG/1
TABLET ORAL
Status: COMPLETED
Start: 2019-07-12 | End: 2019-07-12

## 2019-07-12 RX ORDER — PETROLATUM 42 G/100G
OINTMENT TOPICAL EVERY 12 HOURS SCHEDULED
Start: 2019-07-12

## 2019-07-12 RX ORDER — BUMETANIDE 1 MG/1
1 TABLET ORAL 2 TIMES DAILY
Status: DISCONTINUED | OUTPATIENT
Start: 2019-07-12 | End: 2019-07-12 | Stop reason: HOSPADM

## 2019-07-12 RX ORDER — LORAZEPAM 0.5 MG/1
0.5 TABLET ORAL 2 TIMES DAILY PRN
Start: 2019-07-12

## 2019-07-12 RX ADMIN — DOCUSATE SODIUM 200 MG: 100 CAPSULE, LIQUID FILLED ORAL at 08:18

## 2019-07-12 RX ADMIN — BUSPIRONE HYDROCHLORIDE 5 MG: 5 TABLET ORAL at 15:17

## 2019-07-12 RX ADMIN — CARVEDILOL 3.12 MG: 6.25 TABLET, FILM COATED ORAL at 08:18

## 2019-07-12 RX ADMIN — IPRATROPIUM BROMIDE 0.5 MG: 0.5 SOLUTION RESPIRATORY (INHALATION) at 09:17

## 2019-07-12 RX ADMIN — BUMETANIDE 1 MG: 1 TABLET ORAL at 08:18

## 2019-07-12 RX ADMIN — PETROLATUM: 42 OINTMENT TOPICAL at 08:18

## 2019-07-12 RX ADMIN — LORAZEPAM 0.5 MG: 0.5 TABLET ORAL at 09:04

## 2019-07-12 RX ADMIN — MEMANTINE HYDROCHLORIDE 10 MG: 5 TABLET ORAL at 08:18

## 2019-07-12 RX ADMIN — APIXABAN 2.5 MG: 2.5 TABLET, FILM COATED ORAL at 08:18

## 2019-07-12 RX ADMIN — BUSPIRONE HYDROCHLORIDE 5 MG: 15 TABLET ORAL at 15:17

## 2019-07-12 RX ADMIN — BUSPIRONE HYDROCHLORIDE 5 MG: 5 TABLET ORAL at 08:18

## 2019-07-12 RX ADMIN — SODIUM BICARBONATE 650 MG TABLET 650 MG: at 09:04

## 2019-07-12 RX ADMIN — FAMOTIDINE 20 MG: 20 TABLET, FILM COATED ORAL at 08:18

## 2019-07-12 RX ADMIN — SERTRALINE HYDROCHLORIDE 125 MG: 50 TABLET ORAL at 08:17

## 2019-07-12 RX ADMIN — LEVOTHYROXINE SODIUM 88 MCG: 0.09 TABLET ORAL at 09:21

## 2019-07-12 NOTE — NURSING NOTE
Continued Stay Note  BETY Coleman     Patient Name: Amparo Vergara  MRN: [id number]  Today's Date: 7/12/2019    Admit Date: 7/7/2019    Discharge Plan     Row Name 07/12/19 1027       Plan    Plan Comments  Patient asleep.  Spoke with primary Sindi and she wishes for the patient to sllep for  a while as she has been very anxious.  Will conitnue to follow        Discharge Codes    No documentation.             Jess Vargas RN

## 2019-07-12 NOTE — PLAN OF CARE
Problem: Patient Care Overview  Goal: Plan of Care Review  Outcome: Ongoing (interventions implemented as appropriate)   07/12/19 1420   Coping/Psychosocial   Plan of Care Reviewed With patient   OTHER   Outcome Summary Chart reviewed due to LOS & hx CHF. Poor po intake. Will send CIB to supplement meals and cont to follow.

## 2019-07-12 NOTE — PLAN OF CARE
Problem: Patient Care Overview  Goal: Plan of Care Review  Outcome: Ongoing (interventions implemented as appropriate)      Problem: Fall Risk (Adult)  Goal: Absence of Fall  Outcome: Ongoing (interventions implemented as appropriate)      Problem: Breathing Pattern Ineffective (Adult)  Goal: Effective Oxygenation/Ventilation  Outcome: Ongoing (interventions implemented as appropriate)      Problem: Skin Injury Risk (Adult)  Goal: Skin Health and Integrity  Outcome: Ongoing (interventions implemented as appropriate)

## 2019-07-12 NOTE — PROGRESS NOTES
Pharmacy Patient / Family Medication Counseling    Met with granddaughter and great grandson of patient. Patient was asleep during our visit. Discussed Eliquis use for A fib and possible side effects to watch for. Per family, patient will return to nursing facility at time of discharge. Family verbalized understanding of information and did not have any further medication questions.     Thank you.    Dave VicenteD, [facility]

## 2019-07-12 NOTE — PLAN OF CARE
Problem: Patient Care Overview  Goal: Plan of Care Review  Outcome: Outcome(s) achieved Date Met: 07/12/19      Problem: Fall Risk (Adult)  Goal: Absence of Fall  Outcome: Outcome(s) achieved Date Met: 07/12/19      Problem: Pain, Chronic (Adult)  Goal: Acceptable Pain/Comfort Level and Functional Ability  Outcome: Outcome(s) achieved Date Met: 07/12/19      Problem: Breathing Pattern Ineffective (Adult)  Goal: Effective Oxygenation/Ventilation  Outcome: Outcome(s) achieved Date Met: 07/12/19    Goal: Anxiety/Fear Reduction  Outcome: Outcome(s) achieved Date Met: 07/12/19      Problem: Skin Injury Risk (Adult)  Goal: Skin Health and Integrity  Outcome: Outcome(s) achieved Date Met: 07/12/19

## 2019-07-12 NOTE — PROGRESS NOTES
LOS: 5 days   Patient Care Team:  Nikita Castellon MD as PCP - General (Family Medicine)  Provider, No Known as PCP - Family Medicine    Chief Complaint:     F/u chf    Interval History:     She was asleep this morning and did not really want to answer questions but said that she did not feel particularly short winded and denied chest pain.    Objective   Vital Signs  Temp:  [96.9 °F (36.1 °C)-98.3 °F (36.8 °C)] 98.3 °F (36.8 °C)  Heart Rate:  [60-88] 66  Resp:  [18-24] 20  BP: ()/(63-74) 128/70    Intake/Output Summary (Last 24 hours) at 7/12/2019 0757  Last data filed at 7/11/2019 1816  Gross per 24 hour   Intake 100 ml   Output --   Net 100 ml       Comfortable NAD  Neck supple, no JVD or thyromegaly appreciated  S1/S2 irregular, rate controlled no m/r/g  Lungs CTA B, normal effort  Abdomen S/NT/ND (+) BS, no HSM appreciated  Extremities warm, no clubbing, cyanosis, or edema  No visible or palpable skin lesions  A/Ox4, mood and affect appropriate    Results Review:      Results from last 7 days   Lab Units 07/12/19  0344 07/11/19  0435 07/10/19  0404   SODIUM mmol/L 142 144 143   POTASSIUM mmol/L 3.8 4.0 4.0   CHLORIDE mmol/L 104 104 105   CO2 mmol/L 27.3 28.7 29.1*   BUN mg/dL 34* 40* 39*   CREATININE mg/dL 1.64* 1.78* 1.94*   GLUCOSE mg/dL 104* 121* 78   CALCIUM mg/dL 9.6 9.5 9.2     Results from last 7 days   Lab Units 07/07/19  0836 07/07/19  0205   TROPONIN T ng/mL <0.010 <0.010     Results from last 7 days   Lab Units 07/11/19  0435 07/10/19  0404 07/08/19  0816   WBC 10*3/mm3 6.10 4.00 3.84   HEMOGLOBIN g/dL 9.7* 9.1* 9.2*   HEMATOCRIT % 32.9* 30.1* 30.3*   PLATELETS 10*3/mm3 112* 136* 123*     Results from last 7 days   Lab Units 07/10/19  0404 07/07/19  0205   INR  1.17* 1.21*   APTT seconds  --  50.6*         Results from last 7 days   Lab Units 07/08/19  0730   MAGNESIUM mg/dL 2.0           I reviewed the patient's new clinical results.  I personally viewed and interpreted the patient's  EKG/Telemetry data        Medication Review:     apixaban 2.5 mg Oral Q12H   busPIRone 5 mg Oral TID   carvedilol 3.125 mg Oral BID With Meals   docusate sodium 200 mg Oral Daily   donepezil 10 mg Oral Nightly   famotidine 20 mg Oral Daily   hydrophor  Topical Q12H   levothyroxine 88 mcg Oral Daily   LORazepam 0.5 mg Oral BID   melatonin 5 mg Oral Nightly   memantine 10 mg Oral Q12H   mirtazapine 15 mg Oral Nightly   sertraline 125 mg Oral Daily   sodium bicarbonate 650 mg Oral Daily         hold 1 each       Assessment/Plan       Acute on chronic respiratory failure with hypoxemia (CMS/HCC)    COPD (chronic obstructive pulmonary disease) (CMS/HCC)    Peripheral venous insufficiency    Acute on chronic diastolic CHF (congestive heart failure) (CMS/HCC)    Chronic cor pulmonale (CMS/HCC)    Acute on chronic renal failure (CMS/HCC)    Essential hypertension    Pleural effusion, bilateral    1.  Acute on chronic respiratory failure with hypoxemia (CMS/HCC) - 3 L NC,  Episode of increased SOA during early morning and 02 increased to 5 L.  Appears anxious.  Ativan given, per RN.  02 down to 3L.  There is use of accessory muscles.     2.  Acute on chronic dCHF - echo 7/8/19 - EF 57%, limited views.      3..  Chronic cor pulmonale     4.  Bilateral pleural effusions, R>L - s/p R thoracentesis 7/11/19.       5.  EKG changes - new T wave inversion noted.  No angina.  Given advanced age, renal dysfunction and dementia, would not recommend further ischemic evaluation.     6.  Chronic atrial fibrillation, rate controlled. On apixaban.      7.  LOLLY on CKD - oral diuretics stopped, creatinine continues to trend downward     8.  COPD - chronic 02,  Now on 3L per NC  9.  Chronic venous insufficiency  10. HTN- stable  11. Dementia    Her vitals are stable.  She is on appropriate medical therapy.  We will see as needed.    F/u with Dr. Trinidad/Kevin wang in 1 month.       Linda Cunha MD  07/12/19  7:57 AM

## 2019-07-12 NOTE — DISCHARGE SUMMARY
Amparo Vergara  3/26/1926  [id number]    Hospitalists Discharge Summary    Date of Admission: 7/7/2019  Date of Discharge:  7/12/2019    History of Present Illness:Patient came in with SOA and acute CHF secondary to diastolic heart failure and development of pleural effusions.  She was treated with diuretics and cardiology was consulted.  Yesterday she had a thoracentesis.  We will discharge today and she will follow up with Dr. Trinidad and Kevin Gonzales in 1 Month.  We will discharge back to [facility] on present medications she is on here.  She is on 3 L NC continuous flow.     Hospital Course Summary/Primary Discharge Diagnoses & Secondary Discharge Diagnoses:     Acute on chronic hypoxic respiratory failure secondary to acute congestive heart failure with bilateral pleural effusions  Thoracentesis 7/11/2019 removing 500 cc of fluid  Acute on chronic diastolic congestive heart failure  Acute kidney injury on chronic kidney disease stage III  Abnormal EKG changes were noted and evaluated by cardiology and no work-up was recommended troponins were negative  Acute on chronic anemia and thrombocytopenia  Chronic atrial fibrillation  Acute kidney injury on chronic kidney disease stage III.  Renal function has improved  Hypertension  Hypothyroidism  Dementia  Anxiety and depression    Dyslipidemia  COPD          PCP  Patient Care Team:  Nikita Castellon MD as PCP - General (Family Medicine)  Provider, No Known as PCP - Family Medicine    Consults:   Consults     Date and Time Order Name Status Description    7/8/2019 1848 Inpatient Cardiology Consult Completed           Operations and Procedures Performed:       Xr Chest 2 View    Result Date: 7/7/2019  Narrative: CR Chest 2 Vws INDICATION:  Increasing shortness or hair over the last 3 days COMPARISON:  5/13/2019 FINDINGS: PA and lateral views of the chest.  There appear to be bilateral right greater than left small to moderate effusions and mild bibasilar  atelectasis. Effusions best seen on lateral view. The upper lobes are clear. Heart size is mildly prominent. There are degenerative changes in the right shoulder      Impression: Increased density in both lung bases suggest right greater than left effusions with bibasilar atelectasis. Signer Name: Watson Salazar MD  Signed: 7/7/2019 2:37 AM  Workstation Name: [id number]     Ct Chest Without Contrast    Result Date: 7/8/2019  Narrative: CT SCAN OF THE CHEST WITHOUT CONTRAST 07/08/2019  HISTORY: Increased shortness of breath for the past 4 days. COPD exacerbation. Pleural effusion. Previous smoker.  TECHNIQUE: Spiral CT was performed through the chest without intravenous contrast administration as per clinician request. Radiation dose reduction techniques included automated exposure control or exposure modulation based on body size. Radiation audit for CT and nuclear cardiology exams in the last 12 months: 0.  FINDINGS: There are moderate bilateral pleural effusions, right greater than left with atelectasis in the lower lobes bilaterally, the lingula and posterior right upper lobe. The heart is enlarged and there is a small pericardial effusion measuring 1 cm. There is coronary artery calcification. Recommend clinical correlation for coronary artery disease. Adenopathy in the prevascular space measures 2 cm x 9 mm. In the right peritracheal space, adenopathy measures 2.4 cm x 1.7 cm. In the subcarinal space, adenopathy measures 2.5 cm x 1.9 cm. There is diffuse centrilobular emphysema. Images of the upper abdomen demonstrate low density masses on both kidneys as well as some hyperdense nodules. Findings are indeterminate due to the lack of intravenous contrast. They may represent cysts. Consider nonemergent correlation with renal ultrasound for characterization. There are postoperative changes involving the midline of the upper anterior abdominal wall.      Impression: 1. Moderate bilateral pleural effusions, right  greater than left with bilateral atelectasis. 2. Mediastinal lymphadenopathy as detailed above. 3. Cardiac enlargement. Small pericardial effusion. Coronary artery calcification. Recommend clinical correlation for coronary artery disease. 4. Emphysema. 5. Indeterminate low-density and high-density lesions on the visualized portions of both kidneys. The findings are indeterminate due to the lack of intravenous contrast. Consider nonemergent correlation with renal ultrasound for characterization of these findings.  This report was finalized on 7/8/2019 8:41 AM by Dr. Mika Limon MD.      Xr Chest 1 View    Result Date: 7/11/2019  Narrative: CHEST X-RAY, 07/11/2019 (16:01)  HISTORY: 89-year-old female with acute on chronic respiratory failure. Shortness of air. Thoracentesis earlier today.  TECHNIQUE: AP upright chest x-ray.  FINDINGS: No pneumothorax following thoracentesis earlier today. Small bilateral pleural effusions with bilateral lung base atelectasis, greater on the right. Cardiomegaly and mild vascular congestion, unchanged.      Impression: Stable portable chest x-ray, unchanged since earlier today.  This report was finalized on 7/11/2019 4:18 PM by Dr. Kenneth Queen MD.      Xr Chest 1 View    Result Date: 7/11/2019  Narrative: POSTPROCEDURE CHEST X-RAY, 07/11/2019 (10:21)     HISTORY: Chest x-ray following ultrasound-guided right thoracentesis.  TECHNIQUE: AP portable upright chest x-ray.  COMPARISON: *  Chest x-ray, 07/07/2019.  FINDINGS: No visible pneumothorax following right thoracentesis. Right pleural effusion is decreased in size. Small effusions remain along with bibasilar pulmonary atelectasis.  Pulmonary venous redistribution and mildly increased interstitial markings suggesting mild vascular congestion. Stable moderate cardiomegaly.      Impression: 1. No pneumothorax following right thoracentesis. 2. Right pleural effusion decreased in size. The exam is otherwise unchanged.  This report  was finalized on 7/11/2019 10:48 AM by Dr. Kenneth Queen MD.      Us Thoracentesis    Result Date: 7/11/2019  Narrative: ULTRASOUND-GUIDED RIGHT THORACENTESIS, 07/11/2019  HISTORY: 89-year-old female hospital inpatient with acute on chronic respiratory failure and congestive heart failure. Moderate right and small left pleural effusions on recent chest CT with right lung base atelectasis.  CONSENT: The procedure, risks and alternatives were discussed in detail with the patient, emphasizing the risk of bleeding and pneumothorax. There are Eliquis was halted 48 hours prior. Her questions were entertained, and written informed consent was obtained. Timeout was observed in the procedure room for patient identification and procedure site verification, and the procedure site was marked by me. Permanent images were recorded.  PROCEDURE: Using sterile technique, 1% lidocaine local anesthetic and real-time ultrasound guidance, a 5 [place] thoracentesis catheter was placed into the largest portion of the right pleural effusion at the right lung base from a posterior intercostal approach without difficulty. 500 cc clear, yellow-orange fluid was removed. No additional fluid could be obtained.  Pleural fluid sample was sent for requested laboratory studies. The patient remained stable in the department during and after the procedure. Postprocedure chest x-ray shows no pneumothorax.      Impression: Technically successful ultrasound-guided right thoracentesis with removal of 500 cc effusion fluid.  This report was finalized on 7/11/2019 10:53 AM by Dr. Kenneth Queen MD.        Allergies:  is allergic to codeine; feldene [piroxicam]; norvasc [amlodipine besylate]; relafen [nabumetone]; and sulfa antibiotics.    Tree  reviewed    Discharge Medications:     Discharge Medications      New Medications      Instructions Start Date   hydrophor ointment ointment   Topical, Every 12 Hours Scheduled         Changes to Medications       Instructions Start Date   bumetanide 1 MG tablet  Commonly known as:  BUMEX  What changed:    · how much to take  · when to take this  · Another medication with the same name was removed. Continue taking this medication, and follow the directions you see here.   1 mg, Oral, Every Morning      LORazepam 0.5 MG tablet  Commonly known as:  ATIVAN  What changed:    · when to take this  · reasons to take this  · Another medication with the same name was removed. Continue taking this medication, and follow the directions you see here.   0.5 mg, Oral, 2 Times Daily PRN         Continue These Medications      Instructions Start Date   acetaminophen 500 MG tablet  Commonly known as:  TYLENOL   1,000 mg, Oral, Every 6 Hours PRN      apixaban 2.5 MG tablet tablet  Commonly known as:  ELIQUIS   2.5 mg, Oral, 2 Times Daily      calcium carbonate 500 MG chewable tablet  Commonly known as:  TUMS   1 tablet, Oral, Daily      carvedilol 3.125 MG tablet  Commonly known as:  COREG   6.25 mg, Oral, 2 Times Daily      docusate sodium 250 MG capsule  Commonly known as:  COLACE   250 mg, Oral, Daily      donepezil 10 MG tablet  Commonly known as:  ARICEPT   10 mg, Oral, Nightly      DULCOLAX 10 MG suppository  Generic drug:  bisacodyl   10 mg, Rectal, Daily PRN      ipratropium 0.02 % nebulizer solution  Commonly known as:  ATROVENT   500 mcg, Nebulization, 4 Times Daily PRN      lactulose 10 GM/15ML solution  Commonly known as:  CHRONULAC   20 g, Oral, Daily PRN      levothyroxine 88 MCG tablet  Commonly known as:  SYNTHROID, LEVOTHROID   88 mcg, Oral, Daily      melatonin 5 MG tablet tablet   6 mg, Oral, Nightly      memantine 10 MG tablet  Commonly known as:  NAMENDA   10 mg, Oral, 2 Times Daily      mirtazapine 15 MG tablet  Commonly known as:  REMERON   15 mg, Oral, Nightly      multivitamin with minerals tablet tablet   1 tablet, Oral, Daily      oxyCODONE-acetaminophen 5-325 MG per tablet  Commonly known as:  PERCOCET   1  tablet, Oral, Every 4 Hours PRN      ranitidine 150 MG capsule  Commonly known as:  ZANTAC   150 mg, Oral, 2 Times Daily      sertraline 100 MG tablet  Commonly known as:  ZOLOFT   125 mg, Oral, Daily      sodium bicarbonate 650 MG tablet   650 mg, Oral, Daily      vitamin B-12 1000 MCG tablet  Commonly known as:  CYANOCOBALAMIN   1,000 mcg, Oral, Daily         Stop These Medications    AMBIEN 10 MG tablet  Generic drug:  zolpidem     lisinopril 5 MG tablet  Commonly known as:  PRINIVIL,ZESTRIL     magnesium oxide 400 (241.3 Mg) MG tablet tablet  Commonly known as:  MAGOX     potassium chloride 20 MEQ CR tablet  Commonly known as:  K-DUR,KLOR-CON            Last Lab Results:   Lab Results (most recent)     Procedure Component Value Units Date/Time    Body Fluid Culture - Body Fluid, Pleural Cavity [[id number]] Collected:  07/11/19 1011    Specimen:  Body Fluid from Pleural Cavity Updated:  07/12/19 0820     Body Fluid Culture No growth     Gram Stain Occasional WBCs seen      No organisms seen    Basic Metabolic Panel [[id number]]  (Abnormal) Collected:  07/12/19 0344    Specimen:  Blood Updated:  07/12/19 0514     Glucose 104 mg/dL      BUN 34 mg/dL      Creatinine 1.64 mg/dL      Sodium 142 mmol/L      Potassium 3.8 mmol/L      Chloride 104 mmol/L      CO2 27.3 mmol/L      Calcium 9.6 mg/dL      eGFR Non African Amer 29 mL/min/1.73      BUN/Creatinine Ratio 20.7     Anion Gap 10.7 mmol/L     Narrative:       GFR Normal >60  Chronic Kidney Disease <60  Kidney Failure <15    Protein, Body Fluid - Pleural Fluid, Pleural Cavity [[id number]] Collected:  07/11/19 1010    Specimen:  Pleural Fluid from Pleural Cavity Updated:  07/11/19 1613     Protein, Total, Fluid 1.4 g/dL     Narrative:       No Reference Ranges Established.    A serous fluid total fluid (TP) greater than 50 percent of the serum TP suggests the fluid is an exudate.      1. Pleural TP/Serum TP >0.5  2. Pleural LD/Serum LD >0.6  3. Pleural LD >2/3 of the  upper limit of normal for serum LDH    This test was developed, it performance characteristics determined and judged suitable for clinical purposes by [facility].  It has not been cleared or approved by the FDA.  The laboratory is regulated under CLIA as qualified to perform high-complexity testing.     Lactate Dehydrogenase, Body Fluid - Pleural Fluid, Pleural Cavity [[id number]] Collected:  07/11/19 1010    Specimen:  Pleural Fluid from Pleural Cavity Updated:  07/11/19 1613     Lactate Dehydrogenase (LD), Fluid 94 U/L     Narrative:       No Reference Ranges Established.    Serous fluid LDH greater than 60 percent of the serum LDH or serous fluid LDH two-thirds of the upper limit of normal for serum LDH suggests the fluid is an exudate.     1. Pleural TP/Serum TP >0.5  2. Pleural LD/Serum LD >0.6  3. Pleural LD >2/3 of the upper limit of normal for serum LDH    This test was developed, it performance characteristics determined and judged suitable for clinical purposes by [facility].  It has not been cleared or approved by the FDA.  The laboratory is regulated under CLIA as qualified to perform high-complexity testing.     Body Fluid Cell Count With Differential - Pleural Fluid, Pleural Cavity [[id number]] Collected:  07/11/19 1010    Specimen:  Pleural Fluid from Pleural Cavity Updated:  07/11/19 1125    Narrative:       The following orders were created for panel order Body Fluid Cell Count With Differential - Pleural Fluid, Pleural Cavity.  Procedure                               Abnormality         Status                     ---------                               -----------         ------                     Body fluid cell count - ...[[id number]]                      Final result               Body fluid differential ...[[id number]]                      Final result                 Please view results for these tests on the individual orders.    Body fluid  differential - Pleural Fluid, Pleural Cavity [[id number]] Collected:  07/11/19 1010    Specimen:  Pleural Fluid from Pleural Cavity Updated:  07/11/19 1125     Neutrophils, Fluid 1 %      Lymphocytes, Fluid 99 %      Monocytes, Fluid 0 %      Eosinophils, Fluid 0 %      Basophils, Fluid 0 %      Mononuclear, Fluid 0 %      Plasma Cells, Fluid 0 %      Blasts, Fluid 0 %      Unclassified Cells, Fluid 0 %      Mesothelial Cells, Fluid 0 %      Macrophage, Fluid 0 %     Body fluid cell count - Pleural Fluid, Pleural Cavity [[id number]] Collected:  07/11/19 1010    Specimen:  Pleural Fluid from Pleural Cavity Updated:  07/11/19 1050     Color, Fluid Yellow     Appearance, Fluid Clear     WBC, Fluid 233 /mm3      RBC, Fluid 3,000 /mm3     Comprehensive Metabolic Panel [[id number]]  (Abnormal) Collected:  07/11/19 0435    Specimen:  Blood Updated:  07/11/19 0557     Glucose 121 mg/dL      BUN 40 mg/dL      Creatinine 1.78 mg/dL      Sodium 144 mmol/L      Potassium 4.0 mmol/L      Chloride 104 mmol/L      CO2 28.7 mmol/L      Calcium 9.5 mg/dL      Total Protein 5.7 g/dL      Albumin 3.40 g/dL      ALT (SGPT) 8 U/L      AST (SGOT) 14 U/L      Alkaline Phosphatase 87 U/L      Total Bilirubin 0.4 mg/dL      eGFR Non African Amer 27 mL/min/1.73      Globulin 2.3 gm/dL      A/G Ratio 1.5 g/dL      BUN/Creatinine Ratio 22.5     Anion Gap 11.3 mmol/L     Narrative:       GFR Normal >60  Chronic Kidney Disease <60  Kidney Failure <15    Lactate Dehydrogenase [[id number]]  (Normal) Collected:  07/11/19 0435    Specimen:  Blood Updated:  07/11/19 0555      U/L     CBC & Differential [[id number]] Collected:  07/11/19 0435    Specimen:  Blood Updated:  07/11/19 0544    Narrative:       The following orders were created for panel order CBC & Differential.  Procedure                               Abnormality         Status                     ---------                               -----------         ------                     CBC  Auto Differential[[id number]]        Abnormal            Final result                 Please view results for these tests on the individual orders.    CBC Auto Differential [[id number]]  (Abnormal) Collected:  07/11/19 0435    Specimen:  Blood Updated:  07/11/19 0544     WBC 6.10 10*3/mm3      RBC 3.14 10*6/mm3      Hemoglobin 9.7 g/dL      Hematocrit 32.9 %      .8 fL      MCH 30.9 pg      MCHC 29.5 g/dL      RDW 15.4 %      RDW-SD 58.0 fl      MPV 12.8 fL      Platelets 112 10*3/mm3      Neutrophil % 77.1 %      Lymphocyte % 9.2 %      Monocyte % 11.1 %      Eosinophil % 1.6 %      Basophil % 0.3 %      Immature Grans % 0.7 %      Neutrophils, Absolute 4.70 10*3/mm3      Lymphocytes, Absolute 0.56 10*3/mm3      Monocytes, Absolute 0.68 10*3/mm3      Eosinophils, Absolute 0.10 10*3/mm3      Basophils, Absolute 0.02 10*3/mm3      Immature Grans, Absolute 0.04 10*3/mm3      nRBC 0.0 /100 WBC     Protime-INR [[id number]]  (Abnormal) Collected:  07/10/19 0404    Specimen:  Blood Updated:  07/10/19 0609     Protime 14.6 Seconds      INR 1.17    Narrative:       Therapeutic Ranges for INR: 2.0-3.0 (PT 20-30)                              2.5-3.5 (PT 25-34)    Basic Metabolic Panel [[id number]]  (Abnormal) Collected:  07/10/19 0404    Specimen:  Blood Updated:  07/10/19 0442     Glucose 78 mg/dL      BUN 39 mg/dL      Creatinine 1.94 mg/dL      Sodium 143 mmol/L      Potassium 4.0 mmol/L      Chloride 105 mmol/L      CO2 29.1 mmol/L      Calcium 9.2 mg/dL      eGFR Non African Amer 24 mL/min/1.73      BUN/Creatinine Ratio 20.1     Anion Gap 8.9 mmol/L     Narrative:       GFR Normal >60  Chronic Kidney Disease <60  Kidney Failure <15    CBC & Differential [[id number]] Collected:  07/10/19 0404    Specimen:  Blood Updated:  07/10/19 0425    Narrative:       The following orders were created for panel order CBC & Differential.  Procedure                               Abnormality         Status                     ---------                                -----------         ------                     CBC Auto Differential[[id number]]        Abnormal            Final result                 Please view results for these tests on the individual orders.    CBC Auto Differential [[id number]]  (Abnormal) Collected:  07/10/19 0404    Specimen:  Blood Updated:  07/10/19 0425     WBC 4.00 10*3/mm3      RBC 2.89 10*6/mm3      Hemoglobin 9.1 g/dL      Hematocrit 30.1 %      .2 fL      MCH 31.5 pg      MCHC 30.2 g/dL      RDW 15.6 %      RDW-SD 58.6 fl      MPV 12.4 fL      Platelets 136 10*3/mm3      Neutrophil % 51.5 %      Lymphocyte % 24.8 %      Monocyte % 18.8 %      Eosinophil % 4.3 %      Basophil % 0.3 %      Immature Grans % 0.3 %      Neutrophils, Absolute 2.07 10*3/mm3      Lymphocytes, Absolute 0.99 10*3/mm3      Monocytes, Absolute 0.75 10*3/mm3      Eosinophils, Absolute 0.17 10*3/mm3      Basophils, Absolute 0.01 10*3/mm3      Immature Grans, Absolute 0.01 10*3/mm3      nRBC 0.0 /100 WBC     Magnesium [[id number]]  (Normal) Collected:  07/08/19 0730    Specimen:  Blood Updated:  07/08/19 0820     Magnesium 2.0 mg/dL     Procalcitonin [[id number]]  (Abnormal) Collected:  07/07/19 0836    Specimen:  Blood Updated:  07/07/19 1744     Procalcitonin 0.05 ng/mL     Narrative:       As a Marker for Sepsis (Non-Neonates):   1. <0.5 ng/mL represents a low risk of severe sepsis and/or septic shock.  2. >2 ng/mL represents a high risk of severe sepsis and/or septic shock.    As a Marker for Lower Respiratory Tract Infections that require antibiotic therapy:    PCT on Admission     Antibiotic Therapy       6-12 Hrs later  > 0.5                Strongly Recommended             >0.25 - <0.5         Recommended  0.1 - 0.25           Discouraged              Remeasure/reassess PCT  <0.1                 Strongly Discouraged     Remeasure/reassess PCT                     PCT values of < 0.5 ng/mL do not exclude an infection, because localized  infections (without systemic signs) may be associated with such low concentrations, or a systemic infection in its initial stages (< 6 hours). Furthermore, increased PCT can occur without infection. PCT concentrations between 0.5 and 2.0 ng/mL should be interpreted taking into account the patient's history. It is recommended to retest PCT within 6-24 hours if any concentrations < 2 ng/mL are obtained.    TSH [[id number]]  (Normal) Collected:  07/07/19 0836    Specimen:  Blood Updated:  07/07/19 1744     TSH 2.190 mIU/mL     Troponin [[id number]]  (Normal) Collected:  07/07/19 0836    Specimen:  Blood Updated:  07/07/19 0901     Troponin T <0.010 ng/mL     Narrative:       Troponin T Reference Range:  <= 0.03 ng/mL-   Negative for AMI  >0.03 ng/mL-     Abnormal for myocardial necrosis.  Clinicians would have to utilize clinical acumen, EKG, Troponin and serial changes to determine if it is an Acute Myocardial Infarction or myocardial injury due to an underlying chronic condition.     BNP [[id number]]  (Abnormal) Collected:  07/07/19 0205    Specimen:  Blood Updated:  07/07/19 0238     proBNP 15,695.0 pg/mL     Narrative:       Among patients with dyspnea, NT-proBNP is highly sensitive for the detection of acute congestive heart failure. In addition NT-proBNP of <300 pg/ml effectively rules out acute congestive heart failure with 99% negative predictive value.    Troponin [[id number]]  (Normal) Collected:  07/07/19 0205    Specimen:  Blood Updated:  07/07/19 0237     Troponin T <0.010 ng/mL     Narrative:       Troponin T Reference Range:  <= 0.03 ng/mL-   Negative for AMI  >0.03 ng/mL-     Abnormal for myocardial necrosis.  Clinicians would have to utilize clinical acumen, EKG, Troponin and serial changes to determine if it is an Acute Myocardial Infarction or myocardial injury due to an underlying chronic condition.     Procalcitonin [[id number]]  (Abnormal) Collected:  07/07/19 0205    Specimen:  Blood Updated:   07/07/19 0237     Procalcitonin 0.04 ng/mL     Narrative:       As a Marker for Sepsis (Non-Neonates):   1. <0.5 ng/mL represents a low risk of severe sepsis and/or septic shock.  2. >2 ng/mL represents a high risk of severe sepsis and/or septic shock.    As a Marker for Lower Respiratory Tract Infections that require antibiotic therapy:    PCT on Admission     Antibiotic Therapy       6-12 Hrs later  > 0.5                Strongly Recommended             >0.25 - <0.5         Recommended  0.1 - 0.25           Discouraged              Remeasure/reassess PCT  <0.1                 Strongly Discouraged     Remeasure/reassess PCT                     PCT values of < 0.5 ng/mL do not exclude an infection, because localized infections (without systemic signs) may be associated with such low concentrations, or a systemic infection in its initial stages (< 6 hours). Furthermore, increased PCT can occur without infection. PCT concentrations between 0.5 and 2.0 ng/mL should be interpreted taking into account the patient's history. It is recommended to retest PCT within 6-24 hours if any concentrations < 2 ng/mL are obtained.    Comprehensive Metabolic Panel [[id number]]  (Abnormal) Collected:  07/07/19 0205    Specimen:  Blood Updated:  07/07/19 0237     Glucose 96 mg/dL      BUN 35 mg/dL      Creatinine 1.65 mg/dL      Sodium 144 mmol/L      Potassium 4.9 mmol/L      Chloride 106 mmol/L      CO2 25.9 mmol/L      Calcium 9.7 mg/dL      Total Protein 6.3 g/dL      Albumin 3.40 g/dL      ALT (SGPT) 6 U/L      AST (SGOT) 15 U/L      Alkaline Phosphatase 90 U/L      Total Bilirubin 0.3 mg/dL      eGFR Non African Amer 29 mL/min/1.73      Globulin 2.9 gm/dL      A/G Ratio 1.2 g/dL      BUN/Creatinine Ratio 21.2     Anion Gap 12.1 mmol/L     Narrative:       GFR Normal >60  Chronic Kidney Disease <60  Kidney Failure <15    Protime-INR [[id number]]  (Abnormal) Collected:  07/07/19 0205    Specimen:  Blood Updated:  07/07/19 0221      Protime 15.0 Seconds      INR 1.21    Narrative:       Therapeutic Ranges for INR: 2.0-3.0 (PT 20-30)                              2.5-3.5 (PT 25-34)    aPTT [[id number]]  (Abnormal) Collected:  07/07/19 0205    Specimen:  Blood Updated:  07/07/19 0221     PTT 50.6 seconds     Narrative:       PTT = The equivalent PTT values for the therapeutic range of heparin levels at 0.1 to 0.7 U/ml are 53 to 110 seconds.        Imaging Results (most recent)     Procedure Component Value Units Date/Time    XR Chest 1 View [[id number]] Collected:  07/11/19 1616     Updated:  07/11/19 1620    Narrative:       CHEST X-RAY, 07/11/2019 (16:01)     HISTORY:  89-year-old female with acute on chronic respiratory failure. Shortness  of air. Thoracentesis earlier today.     TECHNIQUE:  AP upright chest x-ray.     FINDINGS:  No pneumothorax following thoracentesis earlier today. Small bilateral  pleural effusions with bilateral lung base atelectasis, greater on the  right. Cardiomegaly and mild vascular congestion, unchanged.       Impression:       Stable portable chest x-ray, unchanged since earlier today.     This report was finalized on 7/11/2019 4:18 PM by Dr. Kenneth Queen MD.       US Thoracentesis [[id number]] Collected:  07/11/19 1048    Specimen:  Body Fluid Updated:  07/11/19 1055    Narrative:       ULTRASOUND-GUIDED RIGHT THORACENTESIS, 07/11/2019     HISTORY:   89-year-old female hospital inpatient with acute on chronic respiratory  failure and congestive heart failure. Moderate right and small left  pleural effusions on recent chest CT with right lung base atelectasis.     CONSENT:   The procedure, risks and alternatives were discussed in detail with the  patient, emphasizing the risk of bleeding and pneumothorax. There are  Eliquis was halted 48 hours prior. Her questions were entertained, and  written informed consent was obtained. Timeout was observed in the  procedure room for patient identification and procedure  site  verification, and the procedure site was marked by me. Permanent images  were recorded.     PROCEDURE:   Using sterile technique, 1% lidocaine local anesthetic and real-time  ultrasound guidance, a 5 Brazilian thoracentesis catheter was placed into  the largest portion of the right pleural effusion at the right lung base  from a posterior intercostal approach without difficulty. 500 cc clear,  yellow-orange fluid was removed. No additional fluid could be obtained.     Pleural fluid sample was sent for requested laboratory studies. The  patient remained stable in the department during and after the  procedure. Postprocedure chest x-ray shows no pneumothorax.       Impression:       Technically successful ultrasound-guided right thoracentesis with  removal of 500 cc effusion fluid.     This report was finalized on 7/11/2019 10:53 AM by Dr. Kenneth Queen MD.       XR Chest 1 View [[id number]] Collected:  07/11/19 1046     Updated:  07/11/19 1053    Narrative:       POSTPROCEDURE CHEST X-RAY, 07/11/2019 (10:21)         HISTORY:  Chest x-ray following ultrasound-guided right thoracentesis.     TECHNIQUE:  AP portable upright chest x-ray.     COMPARISON:  *  Chest x-ray, 07/07/2019.     FINDINGS:  No visible pneumothorax following right thoracentesis. Right pleural  effusion is decreased in size. Small effusions remain along with  bibasilar pulmonary atelectasis.     Pulmonary venous redistribution and mildly increased interstitial  markings suggesting mild vascular congestion. Stable moderate  cardiomegaly.       Impression:       1. No pneumothorax following right thoracentesis.  2. Right pleural effusion decreased in size. The exam is otherwise  unchanged.     This report was finalized on 7/11/2019 10:48 AM by Dr. Kenneth Queen MD.       CT Chest Without Contrast [[id number]] Collected:  07/08/19 0835     Updated:  07/08/19 0843    Narrative:       CT SCAN OF THE CHEST WITHOUT CONTRAST 07/08/2019      HISTORY:  Increased shortness of breath for the past 4 days. COPD exacerbation.  Pleural effusion. Previous smoker.     TECHNIQUE:  Spiral CT was performed through the chest without intravenous contrast  administration as per clinician request. Radiation dose reduction  techniques included automated exposure control or exposure modulation  based on body size. Radiation audit for CT and nuclear cardiology exams  in the last 12 months: 0.     FINDINGS:  There are moderate bilateral pleural effusions, right greater than left  with atelectasis in the lower lobes bilaterally, the lingula and  posterior right upper lobe. The heart is enlarged and there is a small  pericardial effusion measuring 1 cm. There is coronary artery  calcification. Recommend clinical correlation for coronary artery  disease. Adenopathy in the prevascular space measures 2 cm x 9 mm. In  the right peritracheal space, adenopathy measures 2.4 cm x 1.7 cm. In  the subcarinal space, adenopathy measures 2.5 cm x 1.9 cm. There is  diffuse centrilobular emphysema. Images of the upper abdomen demonstrate  low density masses on both kidneys as well as some hyperdense nodules.  Findings are indeterminate due to the lack of intravenous contrast. They  may represent cysts. Consider nonemergent correlation with renal  ultrasound for characterization. There are postoperative changes  involving the midline of the upper anterior abdominal wall.       Impression:       1. Moderate bilateral pleural effusions, right greater than left with  bilateral atelectasis.  2. Mediastinal lymphadenopathy as detailed above.  3. Cardiac enlargement. Small pericardial effusion. Coronary artery  calcification. Recommend clinical correlation for coronary artery  disease.  4. Emphysema.  5. Indeterminate low-density and high-density lesions on the visualized  portions of both kidneys. The findings are indeterminate due to the lack  of intravenous contrast. Consider nonemergent  correlation with renal  ultrasound for characterization of these findings.     This report was finalized on 7/8/2019 8:41 AM by Dr. Mika Limon MD.       XR Chest 2 View [[id number]] Collected:  07/07/19 0237     Updated:  07/07/19 0239    Narrative:       CR Chest 2 Vws    INDICATION:    Increasing shortness or hair over the last 3 days    COMPARISON:    5/13/2019    FINDINGS:   PA and lateral views of the chest.  There appear to be bilateral right greater than left small to moderate effusions and mild bibasilar atelectasis. Effusions best seen on lateral view. The upper lobes are clear. Heart size is mildly prominent. There are  degenerative changes in the right shoulder        Impression:       Increased density in both lung bases suggest right greater than left effusions with bibasilar atelectasis.    Signer Name: Watson Salazar MD   Signed: 7/7/2019 2:37 AM   Workstation Name: [id number]             PROCEDURES      Condition on Discharge:  Stable/ improved but will have issues always with CHF and diuretic balance and kidney function.    Physical Exam at Discharge  Vital Signs  Temp:  [97.3 °F (36.3 °C)-98.3 °F (36.8 °C)] 98 °F (36.7 °C)  Heart Rate:  [60-88] 68  Resp:  [18-24] 18  BP: (117-128)/(61-74) 117/61    Physical Exam   Constitutional:   Elderly and weak female.  89y/o. She is not eating a lot. She sleeps in the fetal position. She is easy to wake up. She appears to be in poor health.   HENT:   Head: Normocephalic and atraumatic.   Eyes: EOM are normal.   Cardiovascular: Normal rate and regular rhythm.   Pulmonary/Chest:   Decrease breath sounds throughout.  No rales or rhonchi.   Abdominal: Soft.   Nontender.   Musculoskeletal:   No peripheral edema   Skin: Skin is warm and dry.   Psychiatric:   Patient is weak and tired and does not eat well.  Needs prompting to eat.         Discharge Disposition  Resume diuretics tomorrow  BMP every Monday and monitor K+ and Kidney funcition/ Creat baseline is 1.4  See  "Dr. Trinidad in 4 weeks.  [facility]  Oxygen nasal cannula 3 L continuous    Visiting Nurse:    No     Home PT/OT:  No     Home Safety Evaluation:  No or as per nursing home    DME  As per nursing home    Discharge Diet:      Dietary Orders (From admission, onward)    Start     Ordered    07/12/19 1800  Dietary Nutrition Supplement: [employer]entials  Daily With Breakfast, Lunch & Dinner     Question:  Select Supplement:  Answer:  [employer] Breakfast Essentials    07/12/19 1421    07/09/19 0931  Diet Regular; Low Sodium; No Added Salt  Diet Effective Now     Question Answer Comment   Diet Texture / Consistency Regular    Common Modifiers Low Sodium    Low Sodium Restriction: No Added Salt        07/09/19 0930          Activity at Discharge:  As tolerated per nursing home    Pre-discharge education  Medications      Follow-up Appointments  No future appointments.  Additional Instructions for the Follow-ups that You Need to Schedule     Discharge Follow-up with Specialty: Dr. Trinidad; 1 Month   As directed      Specialty:  Dr. Trinidad    Follow Up:  1 Month    Follow Up Details:  chf/ pleural effusion               Test Results Pending at Discharge   Order Current Status    Body Fluid Culture - Body Fluid, Pleural Cavity Preliminary result           Adele Luna DO  07/12/19  2:45 PM    Time: 30 min (if over 30 minutes give explanation as to why it took greater than 30 minutes)    \"Dictated utilizing Dragon dictation\"    "

## 2019-07-12 NOTE — PROGRESS NOTES
Adult Nutrition  Assessment/PES    Patient Name:  Amparo Vergara  YOB: 1926  MRN: [id number]  Admit Date:  7/7/2019    Assessment Date:  7/12/2019    Comments:  Encourage po and voice prefs. Relayed prefs to diet office. Will send CIB to supplement meals.   Noted plan for possible transfer back to facility today.    Reason for Assessment     Row Name 07/12/19 1412          Reason for Assessment    Reason For Assessment  per organizational policy LOS, hx CHF     Diagnosis  pulmonary disease;cardiac disease resp failure, CHF , s/p thoracentesis hx CKD  CHF         Nutrition/Diet History     Row Name 07/12/19 1413          Nutrition/Diet History    Typical Food/Fluid Intake  Spoke w rosetta at bedside. Pt has progressively been losing weight for past several yrs due to doing less at nursing home, doesn't leave room or bed for the most part. NKFA. Denies oral supplement that she is aware of at this time. Does have CHF hx but uncertain if special diet at nursing home.Pt resing at visit.           Anthropometrics     Row Name 07/12/19 1414          Anthropometrics    Weight  70.4 kg (155 lb 3.3 oz)        Usual Body Weight (UBW)    Weight Loss Time Frame  has progressive wt loss due to decreased activity and po at nursing home but amount unknown        Body Mass Index (BMI)    BMI Assessment  BMI 25-29.9: overweight         Labs/Tests/Procedures/Meds     Row Name 07/12/19 1415          Labs/Procedures/Meds    Lab Results Reviewed  reviewed     Lab Results Comments  BUn 34/Creat 1.6 elevated        Diagnostic Tests/Procedures    Diagnostic Test/Procedure Reviewed  reviewed        Medications    Pertinent Medications Reviewed  reviewed     Pertinent Medications Comments  bumex, melatonin, remeron, sodium bicarbonate           Estimated/Assessed Needs     Row Name 07/12/19 1416          Estimated/Assessed Needs    Additional Documentation  Calorie Requirements (Group);Protein Requirements (Group);Fluid  Requirements (Group)        Calorie Requirements    Estimated Calorie Need Method  Knox-St Jeor     Estimated Calorie Requirement Comment  1276 kcal ( mifflin 1.2 )         Protein Requirements    Weight Used For Protein Calculations  70.4 kg (155 lb 3.3 oz)     Est Protein Requirement Amount (gms/kg)  0.8 gm protein        Fluid Requirements    Estimated Fluid Requirements (mL/day)  -- 8706-9524  ml CHF guidelines     Estimated Fluid Requirement Method  other (see comments)         Nutrition Prescription Ordered     Row Name 07/12/19 1417          Nutrition Prescription PO    Common Modifiers  Low Sodium     Low Sodium Details  No added salt         Evaluation of Received Nutrient/Fluid Intake     Row Name 07/12/19 1418          Fluid Intake Evaluation    Oral Fluid (mL)  [address] x 3, 21%        PO Evaluation    Number of Meals  9     % PO Intake  22               Problem/Interventions:  Problem 1     Row Name 07/12/19 1418          Nutrition Diagnoses Problem 1    Problem 1  Inadequate Intake/Infusion     Inadequate Intake Type  Oral     Etiology (related to)  Medical Diagnosis;Factors Affecting Nutrition     Signs/Symptoms (evidenced by)  PO Intake     Percent (%) intake recorded  22 %     Over number of meals  9                 Intervention Goal     Row Name 07/12/19 1418          Intervention Goal    PO  Increase intake;PO intake (%)     PO Intake %  50 % consistently or more     Weight  Appropriate weight loss bumex noted         Nutrition Intervention     Row Name 07/12/19 1419          Nutrition Intervention    RD/Tech Action  Interview for preference;Follow Tx progress         Nutrition Prescription     Row Name 07/12/19 1419          Nutrition Prescription PO    PO Prescription  Begin/change supplement     Supplement  [place] [employer]entials         Education/Evaluation     Row Name 07/12/19 1420          Education    Education  Other (comment) pt asleep, spoke nelson sargent at bedside, pt lives  at nursing facility.         Monitor/Evaluation    Monitor  Per protocol;PO intake;I&O;Supplement intake;Pertinent labs;Weight;Symptoms           Electronically signed by:  Mary Lou Quintana RD  07/12/19 2:22 PM

## 2019-07-13 NOTE — NURSING NOTE
Case Management Discharge Note    Final Note: Discharged to [facility]    Destination - Selection Complete      Service Provider Request Status Selected Services Address Phone Number Fax Number    [facility] Selected Skilled Nursing [address] [place] [id number] [phone]      Durable Medical Equipment      No service has been selected for the patient.      Dialysis/Infusion      No service has been selected for the patient.      Home Medical Care      No service has been selected for the patient.      Therapy      No service has been selected for the patient.      Community Resources      No service has been selected for the patient.             Final Discharge Disposition Code: 03 - skilled nursing facility (SNF)

## 2019-07-16 LAB
BACTERIA FLD CULT: NORMAL
GRAM STN SPEC: NORMAL
GRAM STN SPEC: NORMAL

## 2021-06-22 NOTE — PLAN OF CARE
Problem: Patient Care Overview  Goal: Plan of Care Review  Outcome: Ongoing (interventions implemented as appropriate)   07/09/19 0454   Coping/Psychosocial   Plan of Care Reviewed With patient   Plan of Care Review   Progress No change       Problem: Fall Risk (Adult)  Goal: Absence of Fall  Outcome: Ongoing (interventions implemented as appropriate)   07/09/19 0454   Fall Risk (Adult)   Absence of Fall making progress toward outcome       Problem: Pain, Chronic (Adult)  Goal: Acceptable Pain/Comfort Level and Functional Ability  Outcome: Ongoing (interventions implemented as appropriate)   07/09/19 0454   Pain, Chronic (Adult)   Acceptable Pain/Comfort Level and Functional Ability making progress toward outcome       Problem: Breathing Pattern Ineffective (Adult)  Goal: Effective Oxygenation/Ventilation  Outcome: Ongoing (interventions implemented as appropriate)   07/09/19 0454   Breathing Pattern Ineffective (Adult)   Effective Oxygenation/Ventilation making progress toward outcome     Goal: Anxiety/Fear Reduction  Outcome: Ongoing (interventions implemented as appropriate)   07/09/19 0454   Breathing Pattern Ineffective (Adult)   Anxiety/Fear Reduction making progress toward outcome          Previously Declined (within the last year)

## 2023-12-18 NOTE — PROGRESS NOTES
Pharmacy Renal Dosing of Famotidine    Scr = 1.66 mg/dl  CrCl = 20 ml/min    Will change dose to famotidine 20 mg daily per [facility] Renal Dosing Policy guidelines.    Thank you-    Doreen Wang PharmD, [facility]   Is This A New Presentation, Or A Follow-Up?: Acne Additional Comments (Use Complete Sentences): Est pt here for accutane start, pt had urine test done last visit in nov \\n